# Patient Record
Sex: FEMALE | Race: WHITE | NOT HISPANIC OR LATINO
[De-identification: names, ages, dates, MRNs, and addresses within clinical notes are randomized per-mention and may not be internally consistent; named-entity substitution may affect disease eponyms.]

---

## 2020-12-10 ENCOUNTER — TRANSCRIPTION ENCOUNTER (OUTPATIENT)
Age: 75
End: 2020-12-10

## 2021-02-15 ENCOUNTER — TRANSCRIPTION ENCOUNTER (OUTPATIENT)
Age: 76
End: 2021-02-15

## 2023-04-03 PROBLEM — Z00.00 ENCOUNTER FOR PREVENTIVE HEALTH EXAMINATION: Status: ACTIVE | Noted: 2023-04-03

## 2023-04-04 ENCOUNTER — APPOINTMENT (OUTPATIENT)
Dept: ORTHOPEDIC SURGERY | Facility: CLINIC | Age: 78
End: 2023-04-04
Payer: MEDICARE

## 2023-04-04 VITALS
DIASTOLIC BLOOD PRESSURE: 73 MMHG | WEIGHT: 136 LBS | SYSTOLIC BLOOD PRESSURE: 117 MMHG | HEIGHT: 61 IN | HEART RATE: 70 BPM | BODY MASS INDEX: 25.68 KG/M2

## 2023-04-04 DIAGNOSIS — M25.551 PAIN IN RIGHT HIP: ICD-10-CM

## 2023-04-04 DIAGNOSIS — M16.11 UNILATERAL PRIMARY OSTEOARTHRITIS, RIGHT HIP: ICD-10-CM

## 2023-04-04 PROCEDURE — 73502 X-RAY EXAM HIP UNI 2-3 VIEWS: CPT

## 2023-04-04 PROCEDURE — 99205 OFFICE O/P NEW HI 60 MIN: CPT

## 2023-04-04 NOTE — DISCUSSION/SUMMARY
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 Based upon the patients continued symptoms and failure to respond to conservative treatment I have recommended a right total hip replacement for the patient. A discussion was had with the patient regarding a right total hip replacement. Anterior and posterior exposures were discussed. For this patient an anterior approach is appropriate. A long discussion was had with the patient as what the total joint replacement would entail. A model was used to demonstrate the operation and to discuss bearing surfaces of the implants. The hospitalization and rehabilitation were discussed. The use of perioperative antibiotics and DVT prophylaxis were discussed. The risks, benefits and alternatives to surgical intervention were discussed at length with the patient. Specific risks discussed included: infection, wound breakdown, numbness and damage to nerves, tendon, muscle, arteries or other blood vessels, and the possibility of leg length discrepancy. The possibility of recurrent pain, no improvement in pain and actual worsening of the pain were also mentioned in conversation with the patient. Medical complications related to the patient's general medical health including deep vein thrombosis, pulmonary embolism, heart attack, stroke, death and other complications from anesthesia were discussed as well. The patient was told that we will take steps to minimize these risks by using sterile technique, antibiotics and DVT prophylaxis when appropriate and following the patient postoperatively in the clinic setting to monitor progress. The benefits of surgery were discussed with the patient including the potential to improve the current clinical condition through operative intervention. Alternatives to surgical intervention include continued conservative management which may yield less than optimal results in this particular patient. All questions were answered to the satisfaction of the patient. Models were used as an educational tool. We did discuss implant choices and fixation, with shared decision making with the patient.		 \par \par We are planning for robotic assistance for the total hip replacement. We discussed that this will require placement of iliac crest pins in the contralateral iliac crest for pelvic bone registration to allow for robotic guidance for the surgery. We will utilize robotic assistance to improve accuracy of the implants, and accurate restoration of both leg lengths and femoral offset.

## 2023-04-04 NOTE — ADDENDUM
[FreeTextEntry1] : This note was authored by Oniel Liang working as a medical scribe for Dr. Augustin Nj. The note was reviewed, edited, and revised by Dr. Augustin Nj whom is in agreement with the exam findings, imaging findings, and treatment plan. 04/04/2023

## 2023-04-04 NOTE — PHYSICAL EXAM
[de-identified] : The patient appears well nourished  and in no apparent distress.  The patient is alert and oriented to person, place, and time.   Affect and mood appear normal. The head is normocephalic and atraumatic.  The eyes reveal normal sclera and extra ocular muscles are intact. The tongue is midline with no apparent lesions.  Skin shows normal turgor with no evidence of eczema or psoriasis.  No respiratory distress noted.  Sensation grossly intact.		  [de-identified] : Exam of the right hip shows hip flexion of 90 degrees (with pain), hip external rotation of 20 degrees, hip internal rotation of 15 degrees. Her right leg is 5 mm shorter than her left.  \par 5/5 motor strength bilaterally distally. Sensation intact distally.		  [de-identified] : X-ray: AP view of the pelvis and 2 views of the right hip demonstrate bone on bone arthritis.

## 2023-04-04 NOTE — HISTORY OF PRESENT ILLNESS
[de-identified] : Ms. FAIZA VALERIO is a 77 year old female status post left THR 10 years ago with another orthopedist. Patient states that left hip was complicated with an infection 3 days later which required I+D, then IV antibiotics. She no longer takes any abx and has not had issues since. Patient is here today for her right hip. Her pain is localized to the right groin and is worse with weightbearing activity including walking long distance, rising from a seated position, and using the stairs. She also has difficulty putting on socks and shoes. She has not had injections. Patient takes OTC NSAIDs and Tylenol without improvement. Patient has tried Pt without relief.

## 2023-04-10 ENCOUNTER — APPOINTMENT (OUTPATIENT)
Dept: CT IMAGING | Facility: CLINIC | Age: 78
End: 2023-04-10
Payer: MEDICARE

## 2023-04-10 ENCOUNTER — OUTPATIENT (OUTPATIENT)
Dept: OUTPATIENT SERVICES | Facility: HOSPITAL | Age: 78
LOS: 1 days | End: 2023-04-10
Payer: MEDICARE

## 2023-04-10 DIAGNOSIS — M16.11 UNILATERAL PRIMARY OSTEOARTHRITIS, RIGHT HIP: ICD-10-CM

## 2023-04-10 PROCEDURE — 73700 CT LOWER EXTREMITY W/O DYE: CPT | Mod: 26,RT,MH

## 2023-04-10 PROCEDURE — 73700 CT LOWER EXTREMITY W/O DYE: CPT

## 2023-04-19 ENCOUNTER — OUTPATIENT (OUTPATIENT)
Dept: OUTPATIENT SERVICES | Facility: HOSPITAL | Age: 78
LOS: 1 days | End: 2023-04-19
Payer: MEDICARE

## 2023-04-19 VITALS
HEIGHT: 61 IN | SYSTOLIC BLOOD PRESSURE: 130 MMHG | WEIGHT: 138.89 LBS | HEART RATE: 59 BPM | RESPIRATION RATE: 16 BRPM | TEMPERATURE: 98 F | OXYGEN SATURATION: 95 % | DIASTOLIC BLOOD PRESSURE: 82 MMHG

## 2023-04-19 DIAGNOSIS — F41.9 ANXIETY DISORDER, UNSPECIFIED: ICD-10-CM

## 2023-04-19 DIAGNOSIS — Z98.51 TUBAL LIGATION STATUS: Chronic | ICD-10-CM

## 2023-04-19 DIAGNOSIS — Z90.89 ACQUIRED ABSENCE OF OTHER ORGANS: Chronic | ICD-10-CM

## 2023-04-19 DIAGNOSIS — Z86.39 PERSONAL HISTORY OF OTHER ENDOCRINE, NUTRITIONAL AND METABOLIC DISEASE: ICD-10-CM

## 2023-04-19 DIAGNOSIS — Z01.818 ENCOUNTER FOR OTHER PREPROCEDURAL EXAMINATION: ICD-10-CM

## 2023-04-19 DIAGNOSIS — Z98.49 CATARACT EXTRACTION STATUS, UNSPECIFIED EYE: Chronic | ICD-10-CM

## 2023-04-19 DIAGNOSIS — Z29.9 ENCOUNTER FOR PROPHYLACTIC MEASURES, UNSPECIFIED: ICD-10-CM

## 2023-04-19 DIAGNOSIS — H40.9 UNSPECIFIED GLAUCOMA: ICD-10-CM

## 2023-04-19 DIAGNOSIS — K21.9 GASTRO-ESOPHAGEAL REFLUX DISEASE WITHOUT ESOPHAGITIS: ICD-10-CM

## 2023-04-19 DIAGNOSIS — M16.11 UNILATERAL PRIMARY OSTEOARTHRITIS, RIGHT HIP: ICD-10-CM

## 2023-04-19 DIAGNOSIS — Z96.642 PRESENCE OF LEFT ARTIFICIAL HIP JOINT: Chronic | ICD-10-CM

## 2023-04-19 DIAGNOSIS — Z90.49 ACQUIRED ABSENCE OF OTHER SPECIFIED PARTS OF DIGESTIVE TRACT: Chronic | ICD-10-CM

## 2023-04-19 DIAGNOSIS — Z98.84 BARIATRIC SURGERY STATUS: Chronic | ICD-10-CM

## 2023-04-19 LAB
A1C WITH ESTIMATED AVERAGE GLUCOSE RESULT: 5.5 % — SIGNIFICANT CHANGE UP (ref 4–5.6)
ANION GAP SERPL CALC-SCNC: 10 MMOL/L — SIGNIFICANT CHANGE UP (ref 5–17)
APTT BLD: 32 SEC — SIGNIFICANT CHANGE UP (ref 27.5–35.5)
BLD GP AB SCN SERPL QL: SIGNIFICANT CHANGE UP
BUN SERPL-MCNC: 12.7 MG/DL — SIGNIFICANT CHANGE UP (ref 8–20)
CALCIUM SERPL-MCNC: 9.6 MG/DL — SIGNIFICANT CHANGE UP (ref 8.4–10.5)
CHLORIDE SERPL-SCNC: 107 MMOL/L — SIGNIFICANT CHANGE UP (ref 96–108)
CO2 SERPL-SCNC: 27 MMOL/L — SIGNIFICANT CHANGE UP (ref 22–29)
CREAT SERPL-MCNC: 0.58 MG/DL — SIGNIFICANT CHANGE UP (ref 0.5–1.3)
EGFR: 93 ML/MIN/1.73M2 — SIGNIFICANT CHANGE UP
ESTIMATED AVERAGE GLUCOSE: 111 MG/DL — SIGNIFICANT CHANGE UP (ref 68–114)
GLUCOSE SERPL-MCNC: 86 MG/DL — SIGNIFICANT CHANGE UP (ref 70–99)
HCT VFR BLD CALC: 44.6 % — SIGNIFICANT CHANGE UP (ref 34.5–45)
HGB BLD-MCNC: 14.6 G/DL — SIGNIFICANT CHANGE UP (ref 11.5–15.5)
INR BLD: 1.03 RATIO — SIGNIFICANT CHANGE UP (ref 0.88–1.16)
MCHC RBC-ENTMCNC: 31.3 PG — SIGNIFICANT CHANGE UP (ref 27–34)
MCHC RBC-ENTMCNC: 32.7 GM/DL — SIGNIFICANT CHANGE UP (ref 32–36)
MCV RBC AUTO: 95.7 FL — SIGNIFICANT CHANGE UP (ref 80–100)
MRSA PCR RESULT.: SIGNIFICANT CHANGE UP
PLATELET # BLD AUTO: 216 K/UL — SIGNIFICANT CHANGE UP (ref 150–400)
POTASSIUM SERPL-MCNC: 5.1 MMOL/L — SIGNIFICANT CHANGE UP (ref 3.5–5.3)
POTASSIUM SERPL-SCNC: 5.1 MMOL/L — SIGNIFICANT CHANGE UP (ref 3.5–5.3)
PROTHROM AB SERPL-ACNC: 12 SEC — SIGNIFICANT CHANGE UP (ref 10.5–13.4)
RBC # BLD: 4.66 M/UL — SIGNIFICANT CHANGE UP (ref 3.8–5.2)
RBC # FLD: 13.7 % — SIGNIFICANT CHANGE UP (ref 10.3–14.5)
S AUREUS DNA NOSE QL NAA+PROBE: SIGNIFICANT CHANGE UP
SODIUM SERPL-SCNC: 144 MMOL/L — SIGNIFICANT CHANGE UP (ref 135–145)
WBC # BLD: 5.37 K/UL — SIGNIFICANT CHANGE UP (ref 3.8–10.5)
WBC # FLD AUTO: 5.37 K/UL — SIGNIFICANT CHANGE UP (ref 3.8–10.5)

## 2023-04-19 PROCEDURE — 93010 ELECTROCARDIOGRAM REPORT: CPT

## 2023-04-19 PROCEDURE — G0463: CPT

## 2023-04-19 PROCEDURE — 93005 ELECTROCARDIOGRAM TRACING: CPT

## 2023-04-19 RX ORDER — SODIUM CHLORIDE 9 MG/ML
3 INJECTION INTRAMUSCULAR; INTRAVENOUS; SUBCUTANEOUS EVERY 8 HOURS
Refills: 0 | Status: DISCONTINUED | OUTPATIENT
Start: 2023-04-26 | End: 2023-04-26

## 2023-04-19 NOTE — H&P PST ADULT - PROBLEM SELECTOR PLAN 6
Right Hip Total joint Replacement Direct anterior approach with Keith by Dr. Nj on 4/26/23. Medical Clearance pending

## 2023-04-19 NOTE — H&P PST ADULT - HISTORY OF PRESENT ILLNESS
77 year old female  with PMH of MAGAN, Hypothyroidism, Gerd, here for PST states that she has Right Hip Pain for the last        status post left THR 10 years ago  Her pain is localized to the right groin and is worse with weightbearing activity including walking long distance, rising from a seated position, and using the stairs. She also has difficulty putting on socks and shoes. She has not had injections. Patient takes OTC NSAIDs and Tylenol without improvement. She is scheduled for a Right Hip Total joint Replacement Direct anterior approach with Keith by Dr. Nj on 4/26/23. Medical Clearance pending     77 year old female  with PMH of MAGAN (resolved after weight loss) Depression, Hypothyroidism, Glaucoma, Gerd, here for PST states that she has Right Hip Pain for the last  4 months, Reports 6-10/10 depends on the activity, pain is localized to the right groin and is worse with weightbearing activity including walking long distance, rising from a seated position, and using the stairs. She also has difficulty putting on socks and shoes. She has not had injections. Patient takes OTC NSAIDs and Tylenol without improvement, she is status post left THR 10 years ago, Now She is scheduled for a Right Hip Total joint Replacement Direct anterior approach with Keith by Dr. Nj on 4/26/23. Medical Clearance pending

## 2023-04-19 NOTE — H&P PST ADULT - NSICDXPASTMEDICALHX_GEN_ALL_CORE_FT
PAST MEDICAL HISTORY:  Anxiety and depression     GERD (gastroesophageal reflux disease)     Glaucoma     H/O Hashimoto thyroiditis

## 2023-04-19 NOTE — H&P PST ADULT - MUSCULOSKELETAL
details… normal/ROM intact/normal gait/strength 5/5 bilateral upper extremities/strength 5/5 bilateral lower extremities right hip/decreased ROM/decreased ROM due to pain/decreased strength/abnormal gait

## 2023-04-19 NOTE — H&P PST ADULT - NSICDXFAMILYHX_GEN_ALL_CORE_FT
FAMILY HISTORY:  Sibling  Still living? No  Family history of osteoarthritis, Age at diagnosis: Age Unknown  FH: migraines, Age at diagnosis: Age Unknown  FHx: thyroid disease, Age at diagnosis: Age Unknown

## 2023-04-19 NOTE — H&P PST ADULT - PROBLEM SELECTOR PLAN 3
Caprini Score 10 High risk,  Surgical team should assess /Strongly recommend pharmacological and mechanical measures for VTE prophylaxis

## 2023-04-19 NOTE — H&P PST ADULT - NSICDXPASTSURGICALHX_GEN_ALL_CORE_FT
PAST SURGICAL HISTORY:  H/O bariatric surgery     H/O tubal ligation     History of cataract surgery     History of cholecystectomy     History of left hip replacement     History of tonsillectomy

## 2023-04-19 NOTE — H&P PST ADULT - ASSESSMENT
77 year old female  with PMH of MAGAN (resolved after weight loss) Depression, Hypothyroidism, Glaucoma, Gerd, here for PST states that she has Right Hip Pain for the last  4 months, Reports 6-10/10 depends on the activity, pain is localized to the right groin and is worse with weightbearing activity including walking long distance, rising from a seated position, and using the stairs. She also has difficulty putting on socks and shoes. She has not had injections. Patient takes OTC NSAIDs and Tylenol without improvement, she is status post left THR 10 years ago, Now She is scheduled for a Right Hip Total joint Replacement Direct anterior approach with Keith by Dr. Nj on 23. Medical Clearance pending      medications reviewed, instructions given on what medications to take and what not to take. She can take Synthroid and Paroxetine with one sip of water in the AM of surgery. Continue with all eye drops including the AM of surgery. stop Supplements 5-7 days prior to surgery. All other meds can be continued till the night before surgery. pt is not taking ASA/Plavix/Anticoagulation medication at this time. Asked the pt not to take any NSAID's 5-7 days before surgery and told the pt Tylenol is okay to take for pain, pt verbalized understanding. ERP teaching given and the pt verbalized understanding.   CAPRINI VTE 2.0 SCORE [CLOT updated 2019]    AGE RELATED RISK FACTORS                                                       MOBILITY RELATED FACTORS  [ ] Age 41-60 years                                            (1 Point)                    [ ] Bed rest                                                        (1 Point)  [ ] Age: 61-74 years                                           (2 Points)                  [ ] Plaster cast                                                   (2 Points)  [x ] Age= 75 years                                              (3 Points)                    [ ] Bed bound for more than 72 hours                 (2 Points)    DISEASE RELATED RISK FACTORS                                               GENDER SPECIFIC FACTORS  [x ] Edema in the lower extremities                       (1 Point)              [ ] Pregnancy                                                     (1 Point)  [ ] Varicose veins                                               (1 Point)                     [ ] Post-partum < 6 weeks                                   (1 Point)             [ x] BMI > 25 Kg/m2                                            (1 Point)                     [ ] Hormonal therapy  or oral contraception          (1 Point)                 [ ] Sepsis (in the previous month)                        (1 Point)               [ ] History of pregnancy complications                 (1 point)  [ ] Pneumonia or serious lung disease                                               [ ] Unexplained or recurrent                     (1 Point)           (in the previous month)                               (1 Point)  [ ] Abnormal pulmonary function test                     (1 Point)                 SURGERY RELATED RISK FACTORS  [ ] Acute myocardial infarction                              (1 Point)               [ ]  Section                                             (1 Point)  [ ] Congestive heart failure (in the previous month)  (1 Point)      [ ] Minor surgery                                                  (1 Point)   [ ] Inflammatory bowel disease                             (1 Point)               [ ] Arthroscopic surgery                                        (2 Points)  [ ] Central venous access                                      (2 Points)                [ ] General surgery lasting more than 45 minutes (2 points)  [ ] Malignancy- Present or previous                   (2 Points)                [ x] Elective arthroplasty                                         (5 points)    [ ] Stroke (in the previous month)                          (5 Points)                                                                                                                                                           HEMATOLOGY RELATED FACTORS                                                 TRAUMA RELATED RISK FACTORS  [ ] Prior episodes of VTE                                     (3 Points)                [ ] Fracture of the hip, pelvis, or leg                       (5 Points)  [ ] Positive family history for VTE                         (3 Points)             [ ] Acute spinal cord injury (in the previous month)  (5 Points)  [ ] Prothrombin 17428 A                                     (3 Points)               [ ] Paralysis  (less than 1 month)                             (5 Points)  [ ] Factor V Leiden                                             (3 Points)                  [ ] Multiple Trauma within 1 month                        (5 Points)  [ ] Lupus anticoagulants                                     (3 Points)                                                           [ ] Anticardiolipin antibodies                               (3 Points)                                                       [ ] High homocysteine in the blood                      (3 Points)                                             [ ] Other congenital or acquired thrombophilia      (3 Points)                                                [ ] Heparin induced thrombocytopenia                  (3 Points)                                     Total Score [      10    ]  OPIOID RISK TOOL    ALEX EACH BOX THAT APPLIES AND ADD TOTALS AT THE END    FAMILY HISTORY OF SUBSTANCE ABUSE                 FEMALE         MALE                                                Alcohol                             [  ]1 pt          [  ]3pts                                               Illegal Drugs                     [  ]2 pts        [  ]3pts                                               Rx Drugs                           [  ]4 pts        [  ]4 pts    PERSONAL HISTORY OF SUBSTANCE ABUSE                                                                                          Alcohol                             [  ]3 pts       [  ]3 pts                                               Illegal Drugs                     [  ]4 pts        [  ]4 pts                                               Rx Drugs                           [  ]5 pts        [  ]5 pts    AGE BETWEEN 16-45 YEARS                                      [  ]1 pt         [  ]1 pt    HISTORY OF PREADOLESCENT   SEXUAL ABUSE                                                             [  ]3 pts        [  ]0pts    PSYCHOLOGICAL DISEASE                     ADD, OCD, Bipolar, Schizophrenia        [  ]2 pts         [  ]2 pts                      Depression                                               [  ]1 pt           [  ]1 pt           SCORING TOTAL   (add numbers and type here)              ( 0)                                     A score of 3 or lower indicated LOW risk for future opioid abuse  A score of 4 to 7 indicated moderate risk for future opioid abuse  A score of 8 or higher indicates a high risk for opioid abuse

## 2023-04-19 NOTE — H&P PST ADULT - PRIMARY CARE PROVIDER
will see PCP Dr. Chapman for Medical Clearance pending will see PCP Dr. Chapman for Medical Clearance pending [phone]

## 2023-04-25 ENCOUNTER — TRANSCRIPTION ENCOUNTER (OUTPATIENT)
Age: 78
End: 2023-04-25

## 2023-04-25 RX ORDER — TRANEXAMIC ACID 100 MG/ML
1000 INJECTION, SOLUTION INTRAVENOUS ONCE
Refills: 0 | Status: DISCONTINUED | OUTPATIENT
Start: 2023-04-26 | End: 2023-04-26

## 2023-04-25 RX ORDER — GENTAMICIN SULFATE 40 MG/ML
190 VIAL (ML) INJECTION ONCE
Refills: 0 | Status: DISCONTINUED | OUTPATIENT
Start: 2023-04-26 | End: 2023-04-26

## 2023-04-26 ENCOUNTER — INPATIENT (INPATIENT)
Facility: HOSPITAL | Age: 78
LOS: 1 days | Discharge: HOME HEALTH | DRG: 470 | End: 2023-04-28
Attending: ORTHOPAEDIC SURGERY | Admitting: ORTHOPAEDIC SURGERY
Payer: MEDICARE

## 2023-04-26 ENCOUNTER — APPOINTMENT (OUTPATIENT)
Dept: ORTHOPEDIC SURGERY | Facility: HOSPITAL | Age: 78
End: 2023-04-26

## 2023-04-26 ENCOUNTER — TRANSCRIPTION ENCOUNTER (OUTPATIENT)
Age: 78
End: 2023-04-26

## 2023-04-26 VITALS
HEART RATE: 55 BPM | OXYGEN SATURATION: 100 % | WEIGHT: 138.89 LBS | TEMPERATURE: 98 F | SYSTOLIC BLOOD PRESSURE: 113 MMHG | RESPIRATION RATE: 16 BRPM | DIASTOLIC BLOOD PRESSURE: 65 MMHG

## 2023-04-26 DIAGNOSIS — Z98.84 BARIATRIC SURGERY STATUS: Chronic | ICD-10-CM

## 2023-04-26 DIAGNOSIS — Z98.51 TUBAL LIGATION STATUS: Chronic | ICD-10-CM

## 2023-04-26 DIAGNOSIS — Z90.89 ACQUIRED ABSENCE OF OTHER ORGANS: Chronic | ICD-10-CM

## 2023-04-26 DIAGNOSIS — M16.11 UNILATERAL PRIMARY OSTEOARTHRITIS, RIGHT HIP: ICD-10-CM

## 2023-04-26 DIAGNOSIS — Z90.49 ACQUIRED ABSENCE OF OTHER SPECIFIED PARTS OF DIGESTIVE TRACT: Chronic | ICD-10-CM

## 2023-04-26 DIAGNOSIS — Z96.642 PRESENCE OF LEFT ARTIFICIAL HIP JOINT: Chronic | ICD-10-CM

## 2023-04-26 DIAGNOSIS — Z98.49 CATARACT EXTRACTION STATUS, UNSPECIFIED EYE: Chronic | ICD-10-CM

## 2023-04-26 LAB — ABO RH CONFIRMATION: SIGNIFICANT CHANGE UP

## 2023-04-26 PROCEDURE — 27130 TOTAL HIP ARTHROPLASTY: CPT | Mod: AS,RT

## 2023-04-26 PROCEDURE — 73502 X-RAY EXAM HIP UNI 2-3 VIEWS: CPT | Mod: 26,RT

## 2023-04-26 PROCEDURE — 27130 TOTAL HIP ARTHROPLASTY: CPT | Mod: RT

## 2023-04-26 PROCEDURE — 0055T BONE SRGRY CMPTR CT/MRI IMAG: CPT | Mod: RT

## 2023-04-26 DEVICE — MAKO KNEE TIBIAL CHECKPOINT: Type: IMPLANTABLE DEVICE | Site: RIGHT | Status: FUNCTIONAL

## 2023-04-26 DEVICE — LINER ACET TRIDENT X3 0 DEG 36MM D: Type: IMPLANTABLE DEVICE | Site: RIGHT | Status: FUNCTIONAL

## 2023-04-26 DEVICE — SHELL ACET TRIDENT II D 50MM: Type: IMPLANTABLE DEVICE | Site: RIGHT | Status: FUNCTIONAL

## 2023-04-26 DEVICE — HEAD FEM CEM TAPR PLUS 1.5MM 12/14X36MM: Type: IMPLANTABLE DEVICE | Site: RIGHT | Status: FUNCTIONAL

## 2023-04-26 DEVICE — BONE WAX 2.5GM: Type: IMPLANTABLE DEVICE | Site: RIGHT | Status: FUNCTIONAL

## 2023-04-26 DEVICE — SCREW HEX LO PROF 6.5X25MM: Type: IMPLANTABLE DEVICE | Site: RIGHT | Status: FUNCTIONAL

## 2023-04-26 DEVICE — FLOSEAL FAST PREP 10ML: Type: IMPLANTABLE DEVICE | Site: RIGHT | Status: FUNCTIONAL

## 2023-04-26 DEVICE — SCREW HEX LO PROF 6.5X40MM: Type: IMPLANTABLE DEVICE | Site: RIGHT | Status: FUNCTIONAL

## 2023-04-26 DEVICE — MAKO BONE PIN 4MM X 170MM: Type: IMPLANTABLE DEVICE | Site: RIGHT | Status: FUNCTIONAL

## 2023-04-26 DEVICE — STEM FEM ACTIS TAPR STD COLLAR 12/14 SZ 7: Type: IMPLANTABLE DEVICE | Site: RIGHT | Status: FUNCTIONAL

## 2023-04-26 DEVICE — MAKO CHECKPOINT 3.5MM HEX IMPACTION: Type: IMPLANTABLE DEVICE | Site: RIGHT | Status: FUNCTIONAL

## 2023-04-26 RX ORDER — OXYCODONE HYDROCHLORIDE 5 MG/1
10 TABLET ORAL
Refills: 0 | Status: DISCONTINUED | OUTPATIENT
Start: 2023-04-26 | End: 2023-04-28

## 2023-04-26 RX ORDER — ONDANSETRON 8 MG/1
4 TABLET, FILM COATED ORAL EVERY 6 HOURS
Refills: 0 | Status: DISCONTINUED | OUTPATIENT
Start: 2023-04-26 | End: 2023-04-28

## 2023-04-26 RX ORDER — ASPIRIN/CALCIUM CARB/MAGNESIUM 324 MG
81 TABLET ORAL
Refills: 0 | Status: DISCONTINUED | OUTPATIENT
Start: 2023-04-27 | End: 2023-04-28

## 2023-04-26 RX ORDER — FENTANYL CITRATE 50 UG/ML
25 INJECTION INTRAVENOUS
Refills: 0 | Status: DISCONTINUED | OUTPATIENT
Start: 2023-04-26 | End: 2023-04-26

## 2023-04-26 RX ORDER — APREPITANT 80 MG/1
40 CAPSULE ORAL ONCE
Refills: 0 | Status: COMPLETED | OUTPATIENT
Start: 2023-04-26 | End: 2023-04-26

## 2023-04-26 RX ORDER — SODIUM CHLORIDE 9 MG/ML
1000 INJECTION INTRAMUSCULAR; INTRAVENOUS; SUBCUTANEOUS
Refills: 0 | Status: DISCONTINUED | OUTPATIENT
Start: 2023-04-26 | End: 2023-04-28

## 2023-04-26 RX ORDER — ACETAMINOPHEN 500 MG
1000 TABLET ORAL ONCE
Refills: 0 | Status: COMPLETED | OUTPATIENT
Start: 2023-04-26 | End: 2023-04-26

## 2023-04-26 RX ORDER — ASCORBIC ACID 60 MG
2 TABLET,CHEWABLE ORAL
Refills: 0 | DISCHARGE

## 2023-04-26 RX ORDER — KETOROLAC TROMETHAMINE 30 MG/ML
15 SYRINGE (ML) INJECTION EVERY 6 HOURS
Refills: 0 | Status: DISCONTINUED | OUTPATIENT
Start: 2023-04-26 | End: 2023-04-27

## 2023-04-26 RX ORDER — TIMOLOL 0.5 %
1 DROPS OPHTHALMIC (EYE)
Refills: 0 | DISCHARGE

## 2023-04-26 RX ORDER — ONDANSETRON 8 MG/1
4 TABLET, FILM COATED ORAL ONCE
Refills: 0 | Status: DISCONTINUED | OUTPATIENT
Start: 2023-04-26 | End: 2023-04-26

## 2023-04-26 RX ORDER — MAGNESIUM HYDROXIDE 400 MG/1
30 TABLET, CHEWABLE ORAL DAILY
Refills: 0 | Status: DISCONTINUED | OUTPATIENT
Start: 2023-04-26 | End: 2023-04-28

## 2023-04-26 RX ORDER — VANCOMYCIN HCL 1 G
1000 VIAL (EA) INTRAVENOUS
Refills: 0 | Status: COMPLETED | OUTPATIENT
Start: 2023-04-26 | End: 2023-04-27

## 2023-04-26 RX ORDER — POLYETHYLENE GLYCOL 3350 17 G/17G
17 POWDER, FOR SOLUTION ORAL AT BEDTIME
Refills: 0 | Status: DISCONTINUED | OUTPATIENT
Start: 2023-04-26 | End: 2023-04-28

## 2023-04-26 RX ORDER — ACETAMINOPHEN 500 MG
975 TABLET ORAL ONCE
Refills: 0 | Status: COMPLETED | OUTPATIENT
Start: 2023-04-26 | End: 2023-04-26

## 2023-04-26 RX ORDER — SODIUM CHLORIDE 9 MG/ML
1000 INJECTION, SOLUTION INTRAVENOUS
Refills: 0 | Status: DISCONTINUED | OUTPATIENT
Start: 2023-04-26 | End: 2023-04-26

## 2023-04-26 RX ORDER — LATANOPROST 0.05 MG/ML
1 SOLUTION/ DROPS OPHTHALMIC; TOPICAL
Refills: 0 | DISCHARGE

## 2023-04-26 RX ORDER — VANCOMYCIN HCL 1 G
1000 VIAL (EA) INTRAVENOUS ONCE
Refills: 0 | Status: COMPLETED | OUTPATIENT
Start: 2023-04-26 | End: 2023-04-26

## 2023-04-26 RX ORDER — HYDROMORPHONE HYDROCHLORIDE 2 MG/ML
4 INJECTION INTRAMUSCULAR; INTRAVENOUS; SUBCUTANEOUS
Refills: 0 | Status: DISCONTINUED | OUTPATIENT
Start: 2023-04-26 | End: 2023-04-28

## 2023-04-26 RX ORDER — PREGABALIN 225 MG/1
0 CAPSULE ORAL
Refills: 0 | DISCHARGE

## 2023-04-26 RX ORDER — SENNA PLUS 8.6 MG/1
2 TABLET ORAL AT BEDTIME
Refills: 0 | Status: DISCONTINUED | OUTPATIENT
Start: 2023-04-26 | End: 2023-04-28

## 2023-04-26 RX ORDER — PANTOPRAZOLE SODIUM 20 MG/1
40 TABLET, DELAYED RELEASE ORAL
Refills: 0 | Status: DISCONTINUED | OUTPATIENT
Start: 2023-04-26 | End: 2023-04-28

## 2023-04-26 RX ORDER — OXYCODONE HYDROCHLORIDE 5 MG/1
5 TABLET ORAL
Refills: 0 | Status: DISCONTINUED | OUTPATIENT
Start: 2023-04-26 | End: 2023-04-28

## 2023-04-26 RX ORDER — ESOMEPRAZOLE MAGNESIUM 40 MG/1
1 CAPSULE, DELAYED RELEASE ORAL
Refills: 0 | DISCHARGE

## 2023-04-26 RX ORDER — ACETAMINOPHEN 500 MG
975 TABLET ORAL EVERY 8 HOURS
Refills: 0 | Status: DISCONTINUED | OUTPATIENT
Start: 2023-04-26 | End: 2023-04-28

## 2023-04-26 RX ORDER — LEVOTHYROXINE SODIUM 125 MCG
1 TABLET ORAL
Refills: 0 | DISCHARGE

## 2023-04-26 RX ADMIN — FENTANYL CITRATE 25 MICROGRAM(S): 50 INJECTION INTRAVENOUS at 20:10

## 2023-04-26 RX ADMIN — OXYCODONE HYDROCHLORIDE 10 MILLIGRAM(S): 5 TABLET ORAL at 22:10

## 2023-04-26 RX ADMIN — FENTANYL CITRATE 25 MICROGRAM(S): 50 INJECTION INTRAVENOUS at 20:30

## 2023-04-26 RX ADMIN — Medication 1000 MILLIGRAM(S): at 20:30

## 2023-04-26 RX ADMIN — Medication 15 MILLIGRAM(S): at 23:13

## 2023-04-26 RX ADMIN — APREPITANT 40 MILLIGRAM(S): 80 CAPSULE ORAL at 11:55

## 2023-04-26 RX ADMIN — Medication 975 MILLIGRAM(S): at 11:55

## 2023-04-26 RX ADMIN — SODIUM CHLORIDE 75 MILLILITER(S): 9 INJECTION INTRAMUSCULAR; INTRAVENOUS; SUBCUTANEOUS at 21:09

## 2023-04-26 RX ADMIN — POLYETHYLENE GLYCOL 3350 17 GRAM(S): 17 POWDER, FOR SOLUTION ORAL at 21:10

## 2023-04-26 RX ADMIN — OXYCODONE HYDROCHLORIDE 10 MILLIGRAM(S): 5 TABLET ORAL at 21:10

## 2023-04-26 RX ADMIN — Medication 250 MILLIGRAM(S): at 15:05

## 2023-04-26 RX ADMIN — Medication 400 MILLIGRAM(S): at 20:10

## 2023-04-26 RX ADMIN — SENNA PLUS 2 TABLET(S): 8.6 TABLET ORAL at 21:10

## 2023-04-26 RX ADMIN — Medication 15 MILLIGRAM(S): at 23:03

## 2023-04-26 NOTE — DISCHARGE NOTE PROVIDER - NSDCFUADDINST_GEN_ALL_CORE_FT
The patient will be seen in the office between 2-3 weeks for wound check.   **Your first post-operative visit has been scheduled prior to your admission. PLEASE CONTACT OFFICE TO CONFIRM THE APPOINTMENT DATE.   **  The silver based dressing is to be removed 7 days from the date of surgery.   ** CONTACT THE OFFICE IF THE FOLLOWING DEVELOP:  - the dressing becomes soiled or saturated  - you develop a fever greater that 101F  - the wound becomes red or you develop blistering around the wound  * Patient may shower after post-op day #3.   * The patient will continue home PT consistent with anterior total hip replacement protocol and will continue to practice anterior total hip precautions for a minimum of 6 week. Transition to outpatient PT will occur at the time of the first office visit.   * The patient is FULL weight bearing.  * The patient will continue ASPIRIN for 6 weeks after surgery for blood clot prevention.  * While on aspirin, the patient will take daily omeprazole or other similar medication to protect the stomach from irritation.   * The patient will take OXYCODONE AND TYLENOL for pain control and adjust according to prescription and patient needs. Contact the office if pain increases while taking prescribed pain medications or related concerns develop.   * Celebrex will be taken twice daily for 3 weeks for pain control and prevention of excessive bone growth. Additional prescription may be requested at your office follow-up visit.  * The patient will take Senna S while taking oxycodone to prevent narcotic associated constipation.  Additionally, increase water intake (drink at least 8 glasses of water daily) and try adding fiber to the diet by eating fruits, vegetables and foods that are rich in grains. If constipation is experienced, contact the medical/primary care provider to discuss further treatment options.  * To avoid injury at home:  - continue use of rolling walker until cleared by physical therapist  - have family or friend remove all throw rug or objects in hallways that may present a trip hazard.  - if you experience any dizziness or medical concerns, call your medical doctor or  911.  * The implant may activate metal detection devices.  The patient will be seen in the office between 2-3 weeks for wound check.   **Your first post-operative visit has been scheduled prior to your admission. PLEASE CONTACT OFFICE TO CONFIRM THE APPOINTMENT DATE.   **  The silver based dressing is to be removed 7 days from the date of surgery.   ** CONTACT THE OFFICE IF THE FOLLOWING DEVELOP:  - the dressing becomes soiled or saturated  - you develop a fever greater that 101F  - the wound becomes red or you develop blistering around the wound  * Patient may shower after post-op day #3.   * The patient will continue home PT consistent with anterior total hip replacement protocol and will continue to practice anterior total hip precautions for a minimum of 6 week. Transition to outpatient PT will occur at the time of the first office visit.   * The patient is FULL weight bearing.  * The patient will continue ASPIRIN for 6 weeks after surgery for blood clot prevention.  * While on aspirin, the patient will take daily omeprazole or other similar medication to protect the stomach from irritation.   * The patient will take OXYCODONE AND TYLENOL for pain control and adjust according to prescription and patient needs. Contact the office if pain increases while taking prescribed pain medications or related concerns develop.   * Mobic will be taken twice daily for 3 weeks for pain control and prevention of excessive bone growth. Additional prescription may be requested at your office follow-up visit.  * The patient will take Senna S while taking oxycodone to prevent narcotic associated constipation.  Additionally, increase water intake (drink at least 8 glasses of water daily) and try adding fiber to the diet by eating fruits, vegetables and foods that are rich in grains. If constipation is experienced, contact the medical/primary care provider to discuss further treatment options.  * To avoid injury at home:  - continue use of rolling walker until cleared by physical therapist  - have family or friend remove all throw rug or objects in hallways that may present a trip hazard.  - if you experience any dizziness or medical concerns, call your medical doctor or  911.  * The implant may activate metal detection devices.

## 2023-04-26 NOTE — DISCHARGE NOTE PROVIDER - CARE PROVIDER_API CALL
Augustin Nj)  Orthopaedic Surgery  [address], [address]  [place] [address]  Phone: [phone]  Fax: [phone]  Follow Up Time:

## 2023-04-26 NOTE — DISCHARGE NOTE PROVIDER - HOSPITAL COURSE
The patient underwent a RIGHT ANTERIOR TOTAL HIP REPLACEMENT on 4/26/23. The patient received antibiotics consistent with SCIP guidelines. The patient underwent the procedure and had no intra-operative complications. Post-operatively, the patient was seen by medicine and PT. The patient received ASPIRIN for DVTP. The patient received pain medications per orthopedic pain management pathway and the pain was appropriately controlled. Patient was instructed on anterior total hip precautions by PT. The patient did not have any post-operative medical complications. The patient was discharged in stable condition.

## 2023-04-26 NOTE — DISCHARGE NOTE PROVIDER - NSDCCPCAREPLAN_GEN_ALL_CORE_FT
PRINCIPAL DISCHARGE DIAGNOSIS  Diagnosis: Unilateral primary osteoarthritis, right hip  Assessment and Plan of Treatment:

## 2023-04-26 NOTE — DISCHARGE NOTE PROVIDER - NSDCMRMEDTOKEN_GEN_ALL_CORE_FT
ashwagandha:   latanoprost 0.005% ophthalmic solution: 1 in each eye once a day (at bedtime)  magnesium carbonate 250 mg oral capsule: orally once a day  Multiple Vitamins oral capsule: 1 orally once a day  neurovia:   NexIUM 20 mg oral delayed release capsule: 1 orally once a day  NexIUM 40 mg oral delayed release capsule: 1 orally  PARoxetine 40 mg oral tablet: 1 orally once a day  Synthroid 125 mcg (0.125 mg) oral tablet: 1 orally every other day  Synthroid 150 mcg (0.15 mg) oral tablet: 1 orally every other day  Timolol Maleate (Eqv-Timoptic) 0.5% ophthalmic solution: 1 in each affected eye 2 times a day  vitamin b12:   Vitamin C 500 mg oral capsule: 2 orally once a day   Aspirin Enteric Coated 81 mg oral delayed release tablet: 1 tab(s) orally 2 times a day  latanoprost 0.005% ophthalmic solution: 1 in each eye once a day (at bedtime)  Mobic 7.5 mg oral tablet: 1 tab(s) orally 2 times a day x 21 days  Multiple Vitamins oral capsule: 1 orally once a day  NexIUM 20 mg oral delayed release capsule: 1 orally once a day  oxyCODONE 5 mg oral tablet: 1 tab(s) orally every 6 hours as needed for  moderate pain MDD: 4  PARoxetine 40 mg oral tablet: 1 orally once a day  Senna S 50 mg-8.6 mg oral tablet: 2 tab(s) orally once a day (at bedtime) x 7 days  Synthroid 125 mcg (0.125 mg) oral tablet: 1 orally every other day  Timolol Maleate (Eqv-Timoptic) 0.5% ophthalmic solution: 1 in each affected eye 2 times a day  Tylenol 325 mg oral tablet: 2 tab(s) orally every 6 hours as needed for  mild pain  vitamin b12:   Vitamin C 500 mg oral capsule: 2 orally once a day

## 2023-04-26 NOTE — PROGRESS NOTE ADULT - SUBJECTIVE AND OBJECTIVE BOX
Ortho Post Op Check    Name: FAIZA VALERIO    MR #: [id number]    Procedure: right anterior total hip arthroplasty DAA  Surgeon: Ondina    Pt comfortable without complaints, pain controlled  Denies CP, SOB, N/V, numbness/tingling     General Exam:  Vital Signs Last 24 Hrs  T(C): 36.4 (04-26-23 @ 21:00), Max: 36.7 (04-26-23 @ 20:00)  T(F): 97.5 (04-26-23 @ 21:00), Max: 98 (04-26-23 @ 20:00)  HR: 55 (04-26-23 @ 21:00) (55 - 62)  BP: 156/77 (04-26-23 @ 21:00) (113/65 - 156/77)  BP(mean): 82 (04-26-23 @ 20:30) (82 - 91)  RR: 18 (04-26-23 @ 21:00) (13 - 19)  SpO2: 97% (04-26-23 @ 21:00) (97% - 100%)    General: Pt Alert and oriented, NAD, controlled pain.  Dressings C/D/I. No bleeding.  Pulses: 2+ dorsalis pedis pulse. Cap refill < 2 sec.  Sensation: Grossly intact to light touch without deficit.  Motor: + EHL/FHL/TA/GS    T(C): 36.4 (04-26-23 @ 21:00), Max: 36.7 (04-26-23 @ 11:59)  HR: 55 (04-26-23 @ 21:00) (55 - 62)  BP: 156/77 (04-26-23 @ 21:00) (113/65 - 156/77)  RR: 18 (04-26-23 @ 21:00) (13 - 19)  SpO2: 97% (04-26-23 @ 21:00) (97% - 100%)    Post-op X-Ray:    Pelvis & hip films reviewed. Implants are in appropriate position. No fracture or dislocation noted. Patient is WBAT of the surgical extremity.     A/P: 77yFemale POD#0 s/p right anterior total hip arthroplasty DAA  - Stable  - Pain Control  - DVT ppx: asa  - WBAT  - anterior hip precautions

## 2023-04-26 NOTE — BRIEF OPERATIVE NOTE - NSICDXBRIEFPOSTOP_GEN_ALL_CORE_FT
POST-OP DIAGNOSIS:  Unilateral primary osteoarthritis, right hip 26-Apr-2023 17:51:10  Terence John

## 2023-04-26 NOTE — BRIEF OPERATIVE NOTE - NSICDXBRIEFPREOP_GEN_ALL_CORE_FT
PRE-OP DIAGNOSIS:  Unilateral primary osteoarthritis, right hip 26-Apr-2023 17:51:03  Terence John

## 2023-04-26 NOTE — PATIENT PROFILE ADULT - FALL HARM RISK - HARM RISK INTERVENTIONS
Assistance with ambulation/Assistance OOB with selected safe patient handling equipment/Communicate Risk of Fall with Harm to all staff/Discuss with provider need for PT consult/Monitor gait and stability/Provide patient with walking aids - walker, cane, crutches/Reinforce activity limits and safety measures with patient and family/Sit up slowly, dangle for a short time, stand at bedside before walking/Tailored Fall Risk Interventions/Use of alarms - bed, chair and/or voice tab/Visual Cue: Yellow wristband and red socks/Bed in lowest position, wheels locked, appropriate side rails in place/Call bell, personal items and telephone in reach/Instruct patient to call for assistance before getting out of bed or chair/Non-slip footwear when patient is out of bed/[place] to call system/Physically safe environment - no spills, clutter or unnecessary equipment/Purposeful Proactive Rounding/Room/bathroom lighting operational, light cord in reach

## 2023-04-26 NOTE — DISCHARGE NOTE PROVIDER - NSDCFUSCHEDAPPT_GEN_ALL_CORE_FT
Augustin Nj  [facility]  ORTHOSUR [address]  Scheduled Appointment: 05/16/2023    Augustin Nj  [facility]  [address]THOR [address] [facility] [address]  Scheduled Appointment: 06/06/2023

## 2023-04-27 ENCOUNTER — TRANSCRIPTION ENCOUNTER (OUTPATIENT)
Age: 78
End: 2023-04-27

## 2023-04-27 PROCEDURE — 99232 SBSQ HOSP IP/OBS MODERATE 35: CPT

## 2023-04-27 RX ORDER — LEVOTHYROXINE SODIUM 125 MCG
1 TABLET ORAL
Refills: 0 | DISCHARGE

## 2023-04-27 RX ORDER — ASPIRIN/CALCIUM CARB/MAGNESIUM 324 MG
1 TABLET ORAL
Qty: 84 | Refills: 0
Start: 2023-04-27 | End: 2023-06-07

## 2023-04-27 RX ORDER — MAGNESIUM CARBONATE 54 MG/5 ML
0 LIQUID (ML) ORAL
Refills: 0 | DISCHARGE

## 2023-04-27 RX ORDER — SODIUM CHLORIDE 9 MG/ML
500 INJECTION, SOLUTION INTRAVENOUS ONCE
Refills: 0 | Status: COMPLETED | OUTPATIENT
Start: 2023-04-27 | End: 2023-04-27

## 2023-04-27 RX ORDER — ACETAMINOPHEN 500 MG
2 TABLET ORAL
Qty: 56 | Refills: 0
Start: 2023-04-27 | End: 2023-05-03

## 2023-04-27 RX ORDER — MELOXICAM 15 MG/1
1 TABLET ORAL
Qty: 42 | Refills: 0
Start: 2023-04-27 | End: 2023-05-17

## 2023-04-27 RX ORDER — SENNOSIDES/DOCUSATE SODIUM 8.6MG-50MG
2 TABLET ORAL
Qty: 14 | Refills: 0
Start: 2023-04-27 | End: 2023-05-03

## 2023-04-27 RX ORDER — ACETAMINOPHEN 500 MG
1000 TABLET ORAL ONCE
Refills: 0 | Status: COMPLETED | OUTPATIENT
Start: 2023-04-27 | End: 2023-04-27

## 2023-04-27 RX ORDER — METOCLOPRAMIDE HCL 10 MG
10 TABLET ORAL ONCE
Refills: 0 | Status: COMPLETED | OUTPATIENT
Start: 2023-04-27 | End: 2023-04-27

## 2023-04-27 RX ORDER — OXYCODONE HYDROCHLORIDE 5 MG/1
1 TABLET ORAL
Qty: 28 | Refills: 0
Start: 2023-04-27 | End: 2023-05-03

## 2023-04-27 RX ORDER — ESOMEPRAZOLE MAGNESIUM 40 MG/1
1 CAPSULE, DELAYED RELEASE ORAL
Refills: 0 | DISCHARGE

## 2023-04-27 RX ADMIN — PANTOPRAZOLE SODIUM 40 MILLIGRAM(S): 20 TABLET, DELAYED RELEASE ORAL at 04:38

## 2023-04-27 RX ADMIN — Medication 15 MILLIGRAM(S): at 19:35

## 2023-04-27 RX ADMIN — SODIUM CHLORIDE 500 MILLILITER(S): 9 INJECTION, SOLUTION INTRAVENOUS at 10:09

## 2023-04-27 RX ADMIN — Medication 15 MILLIGRAM(S): at 19:21

## 2023-04-27 RX ADMIN — Medication 400 MILLIGRAM(S): at 18:30

## 2023-04-27 RX ADMIN — Medication 15 MILLIGRAM(S): at 13:15

## 2023-04-27 RX ADMIN — Medication 81 MILLIGRAM(S): at 04:38

## 2023-04-27 RX ADMIN — Medication 1000 MILLIGRAM(S): at 19:00

## 2023-04-27 RX ADMIN — Medication 10 MILLIGRAM(S): at 18:30

## 2023-04-27 RX ADMIN — OXYCODONE HYDROCHLORIDE 10 MILLIGRAM(S): 5 TABLET ORAL at 04:39

## 2023-04-27 RX ADMIN — Medication 975 MILLIGRAM(S): at 22:44

## 2023-04-27 RX ADMIN — Medication 15 MILLIGRAM(S): at 04:45

## 2023-04-27 RX ADMIN — Medication 975 MILLIGRAM(S): at 04:38

## 2023-04-27 RX ADMIN — Medication 30 MILLILITER(S): at 10:20

## 2023-04-27 RX ADMIN — Medication 250 MILLIGRAM(S): at 02:46

## 2023-04-27 RX ADMIN — ONDANSETRON 4 MILLIGRAM(S): 8 TABLET, FILM COATED ORAL at 13:14

## 2023-04-27 RX ADMIN — Medication 975 MILLIGRAM(S): at 23:44

## 2023-04-27 RX ADMIN — Medication 975 MILLIGRAM(S): at 04:45

## 2023-04-27 RX ADMIN — OXYCODONE HYDROCHLORIDE 10 MILLIGRAM(S): 5 TABLET ORAL at 05:40

## 2023-04-27 RX ADMIN — Medication 30 MILLILITER(S): at 16:30

## 2023-04-27 RX ADMIN — Medication 81 MILLIGRAM(S): at 18:28

## 2023-04-27 RX ADMIN — Medication 15 MILLIGRAM(S): at 04:39

## 2023-04-27 NOTE — DISCHARGE NOTE NURSING/CASE MANAGEMENT/SOCIAL WORK - PATIENT PORTAL LINK FT
You can access the FollowMyHealth Patient Portal offered by [facility] by registering at the following website: [email or web address]. By joining [employer]’s FollowMyHealth portal, you will also be able to view your health information using other applications (apps) compatible with our system.

## 2023-04-27 NOTE — PROGRESS NOTE ADULT - SUBJECTIVE AND OBJECTIVE BOX
Ortho Post Op Check    Name: FAIZA VALERIO    MR #: [id number]    Procedure: Right DAA total hip arthroplasty   Surgeon: Dr Nj    Pt comfortable without complaints, pain controlled  Denies CP, SOB, N/V, numbness/tingling           PAST MEDICAL & SURGICAL HISTORY:  Anxiety and depression    H/O Hashimoto thyroiditis    GERD (gastroesophageal reflux disease)    Glaucoma    History of tonsillectomy    H/O tubal ligation    History of cholecystectomy    History of left hip replacement    History of cataract surgery    H/O bariatric surgery              General Exam:  Vital Signs Last 24 Hrs  T(C): 36.4 (04-27-23 @ 04:12), Max: 36.4 (04-27-23 @ 04:12)  T(F): 97.5 (04-27-23 @ 04:12), Max: 97.5 (04-27-23 @ 04:12)  HR: 51 (04-27-23 @ 04:12) (51 - 51)  BP: 115/74 (04-27-23 @ 04:12) (115/74 - 115/74)  BP(mean): --  RR: 18 (04-27-23 @ 04:12) (18 - 18)  SpO2: 94% (04-27-23 @ 04:12) (94% - 94%)    General: Pt Alert and oriented, NAD, controlled pain.  Right ant hip mepilex bandage remains C/D/I. No bleeding.  Pulses: 2+ dorsalis pedis pulse. Cap refill < 2 sec.  Sensation: Grossly intact to light touch without deficit.  Motor: + EHL/FHL/TA/GS  Calf soft, supple and nontender           MEDICATIONS  (STANDING):  acetaminophen     Tablet .. 975 milliGRAM(s) Oral every 8 hours  aspirin enteric coated 81 milliGRAM(s) Oral two times a day  ketorolac   Injectable 15 milliGRAM(s) IV Push every 6 hours  multivitamin 1 Tablet(s) Oral daily  pantoprazole    Tablet 40 milliGRAM(s) Oral before breakfast  polyethylene glycol 3350 17 Gram(s) Oral at bedtime  senna 2 Tablet(s) Oral at bedtime  sodium chloride 0.9%. 1000 milliLiter(s) (75 mL/Hr) IV Continuous <Continuous>    MEDICATIONS  (PRN):  aluminum hydroxide/magnesium hydroxide/simethicone Suspension 30 milliLiter(s) Oral four times a day PRN Indigestion  HYDROmorphone   Tablet 4 milliGRAM(s) Oral every 3 hours PRN Severe Pain (7 - 10)  magnesium hydroxide Suspension 30 milliLiter(s) Oral daily PRN Constipation  ondansetron Injectable 4 milliGRAM(s) IV Push every 6 hours PRN Nausea and/or Vomiting  oxyCODONE    IR 10 milliGRAM(s) Oral every 3 hours PRN Moderate Pain (4 - 6)  oxyCODONE    IR 5 milliGRAM(s) Oral every 3 hours PRN Mild Pain (1 - 3)              A/P: 77yFemale POD#1 s/p Right DAA total hip arthroplasty   - Stable  - Pain Control  - DVT ppx: ASA  - Post op abx: vanco given   - PT eval pending  - Weight bearing status: WBAT w Anterior precautions   - DC to home today pending PT

## 2023-04-27 NOTE — CONSULT NOTE ADULT - ASSESSMENT
78 y/o F with hx of Depression, Hypothyroidism, Glaucoma, Gerd, OA s/p status post left THR 10 years ago, she has left hip pain for 4 moths now, has been worsening, she came in here for elective Right Hip Total joint Replacement Direct anterior approach with Keith by Dr. Nj on 4/26/23 s/p procedure.     Plan:     OA of the Right hip s/p THR post op day 01:     - post op no complications  - VS stable  - abx per ortho   - c/w anti hypertensive to be restarted post op day 02 except if blood pressure goes >150 systolic   - c/w IVF x 24 hrs then reassess per ortho  - opiate induced constipation regimen   - encouraging incentive spirometry   -c/w local wound care per ortho   -DVT prophylaxis and Pain meds as per Ortho team   -PT/OT and weight bearing per ortho         Hypothyroid: On Synthroid     GERD: On NexIUM 40 mg daily     Anxiety: PARoxetine 40 mg once a day    Glaucoma: Latanoprost 0.005% 1 in each eye once a day (at bedtime) and Timolol Maleate 1 in each affected eye 2 times a day      has dizziness: getting IV fluids, will work with PT and OT

## 2023-04-27 NOTE — PHYSICAL THERAPY INITIAL EVALUATION ADULT - ADDITIONAL COMMENTS
Pt lives in a house  with 5 steps to enter with  rails and 0 stairs inside.  Pt owns medical equipment: SAC, RW   Pt lives with: Grandson   Someone is always available to provide assist.

## 2023-04-27 NOTE — PHYSICAL THERAPY INITIAL EVALUATION ADULT - ACTIVE RANGE OF MOTION EXAMINATION, REHAB EVAL
Right LE grossly 2/5/bilateral upper extremity Active ROM was WFL (within functional limits)/bilateral  lower extremity Active ROM was WFL (within functional limits)

## 2023-04-27 NOTE — PHYSICAL THERAPY INITIAL EVALUATION ADULT - GAIT TRAINING, PT EVAL
Time Frame:  1-2   days   Goal: Modified Independent with RW x 300 feet / Stairs: pt will be independent with 5 steps

## 2023-04-27 NOTE — CONSULT NOTE ADULT - SUBJECTIVE AND OBJECTIVE BOX
76 y/o F with hx of Depression, Hypothyroidism, Glaucoma, Gerd, OA s/p status post left THR 10 years ago, she has left hip pain for 4 moths now, has been worsening, she came in here for elective Right Hip Total joint Replacement Direct anterior approach with Keith by Dr. Nj on 4/26/23 s/p procedure, Patient tolerated procedure well, her pain is well controlled, she has some dizziness, denies chest pain or sob, fever, chills, chest pain         Allergies:  	Macrodantin: Drug, Angioedema, Urticaria, Short breath  	Zosyn: Drug, Anaphylaxis  	sulfa drugs: Drug Category, Angioedema    PAST MEDICAL HISTORY:  Anxiety and depression     GERD (gastroesophageal reflux disease)     Glaucoma     H/O Hashimoto thyroiditis.     PAST SURGICAL HISTORY:  H/O bariatric surgery     H/O tubal ligation     History of cataract surgery     History of cholecystectomy     History of left hip replacement     History of tonsillectomy.     FAMILY HISTORY:  Sibling  Still living? No  Family history of osteoarthritis, Age at diagnosis: Age Unknown  FH: migraines, Age at diagnosis: Age Unknown  FHx: thyroid disease, Age at diagnosis: Age Unknown.     Anesthesia History:  · Previous Reaction to Anesthesia	none  · Family History of Anesthesia Reaction/Malignant Hyperthermia	No  · Latex Allergy	No    Social History:   Not a smoker, drinker or using any drugs      Home Medications:   * Incomplete Medication History as of 19-Apr-2023 09:19 documented in Structured Notes  · 	Synthroid 125 mcg (0.125 mg) oral tablet: Last Dose Taken:  , 1 orally every other day  · 	NexIUM 40 mg oral delayed release capsule: Last Dose Taken:  , 1 orally  · 	Synthroid 150 mcg (0.15 mg) oral tablet: Last Dose Taken:  , 1 orally every other day  · 	Vitamin C 500 mg oral capsule: 2 orally once a day  · 	Multiple Vitamins oral capsule: Last Dose Taken:  , 1 orally once a day  · 	PARoxetine 40 mg oral tablet: Last Dose Taken:  , 1 orally once a day  · 	magnesium carbonate 250 mg oral capsule: Last Dose Taken:  , orally once a day  · 	latanoprost 0.005% ophthalmic solution: Last Dose Taken:  , 1 in each eye once a day (at bedtime)  · 	Timolol Maleate (Eqv-Timoptic) 0.5% ophthalmic solution: 1 in each affected eye 2 times a day  · 	NexIUM 20 mg oral delayed release capsule: Last Dose Taken:  , 1 orally once a day  · 	ashwagandha: Last Dose Taken:    · 	neurovia: Last Dose Taken:            Vital Signs Last 24 Hrs  T(C): 36.7 (27 Apr 2023 08:59), Max: 36.7 (26 Apr 2023 20:00)  T(F): 98 (27 Apr 2023 08:59), Max: 98 (26 Apr 2023 20:00)  HR: 59 (27 Apr 2023 10:54) (51 - 62)  BP: 117/78 (27 Apr 2023 10:54) (96/61 - 156/77)  BP(mean): 82 (26 Apr 2023 20:30) (82 - 91)  RR: 18 (27 Apr 2023 08:59) (13 - 19)  SpO2: 94% (27 Apr 2023 08:59) (94% - 100%)    Parameters below as of 27 Apr 2023 08:59  Patient On (Oxygen Delivery Method): room air        PHYSICAL EXAM:    GENERAL: Elderly female looking comfortable   HEENT: PERRL, +EOMI  NECK: soft, Supple, No JVD,   CHEST/LUNG: Clear to auscultate bilaterally; No wheezing  HEART: S1S2+, Regular rate and rhythm; No murmurs  ABDOMEN: Soft, Nontender, Nondistended; Bowel sounds present  EXTREMITIES:  1+ Peripheral Pulses, No edema, right hip with clean dressings on, no bleeding or soaking   SKIN: No rashes or lesions  NEURO: AAOX3, no focal deficits, no motor r sensory loss  PSYCH: normal mood      LABS:                  I&O's Summary    26 Apr 2023 07:01  -  27 Apr 2023 07:00  --------------------------------------------------------  IN: 825 mL / OUT: 150 mL / NET: 675 mL        MEDICATIONS  (STANDING):  acetaminophen     Tablet .. 975 milliGRAM(s) Oral every 8 hours  aspirin enteric coated 81 milliGRAM(s) Oral two times a day  ketorolac   Injectable 15 milliGRAM(s) IV Push every 6 hours  multivitamin 1 Tablet(s) Oral daily  pantoprazole    Tablet 40 milliGRAM(s) Oral before breakfast  polyethylene glycol 3350 17 Gram(s) Oral at bedtime  senna 2 Tablet(s) Oral at bedtime  sodium chloride 0.9%. 1000 milliLiter(s) (75 mL/Hr) IV Continuous <Continuous>    MEDICATIONS  (PRN):  aluminum hydroxide/magnesium hydroxide/simethicone Suspension 30 milliLiter(s) Oral four times a day PRN Indigestion  HYDROmorphone   Tablet 4 milliGRAM(s) Oral every 3 hours PRN Severe Pain (7 - 10)  magnesium hydroxide Suspension 30 milliLiter(s) Oral daily PRN Constipation  ondansetron Injectable 4 milliGRAM(s) IV Push every 6 hours PRN Nausea and/or Vomiting  oxyCODONE    IR 10 milliGRAM(s) Oral every 3 hours PRN Moderate Pain (4 - 6)  oxyCODONE    IR 5 milliGRAM(s) Oral every 3 hours PRN Mild Pain (1 - 3)         76 y/o F with hx of Depression, Hypothyroidism, Glaucoma, Gerd, OA s/p status post left THR 10 years ago, she has left hip pain for 4 moths now, has been worsening, she came in here for elective Right Hip Total joint Replacement Direct anterior approach with Keith by Dr. Nj on 4/26/23 s/p procedure, Patient tolerated procedure well, her pain is well controlled, she has some dizziness, denies chest pain or sob, fever, chills, chest pain         Allergies:  	Macrodantin: Drug, Angioedema, Urticaria, Short breath  	Zosyn: Drug, Anaphylaxis  	sulfa drugs: Drug Category, Angioedema    PAST MEDICAL HISTORY:  Anxiety and depression     GERD (gastroesophageal reflux disease)     Glaucoma     H/O Hashimoto thyroiditis.     PAST SURGICAL HISTORY:  H/O bariatric surgery     H/O tubal ligation     History of cataract surgery     History of cholecystectomy     History of left hip replacement     History of tonsillectomy.     FAMILY HISTORY:  Sibling  Still living? No  Family history of osteoarthritis, Age at diagnosis: Age Unknown  FH: migraines, Age at diagnosis: Age Unknown  FHx: thyroid disease, Age at diagnosis: Age Unknown.     Anesthesia History:  · Previous Reaction to Anesthesia	none  · Family History of Anesthesia Reaction/Malignant Hyperthermia	No  · Latex Allergy	No    Social History:   Not a smoker, drinker or using any drugs      Home Medications:   * Incomplete Medication History as of 19-Apr-2023 09:19 documented in Structured Notes  · 	Synthroid 125 mcg (0.125 mg) oral tablet: Last Dose Taken:  , 1 orally every other day  · 	NexIUM 40 mg oral delayed release capsule: Last Dose Taken:  , 1 orally  · 	Synthroid 150 mcg (0.15 mg) oral tablet: Last Dose Taken:  , 1 orally every other day  · 	Vitamin C 500 mg oral capsule: 2 orally once a day  · 	Multiple Vitamins oral capsule: Last Dose Taken:  , 1 orally once a day  · 	PARoxetine 40 mg oral tablet: Last Dose Taken:  , 1 orally once a day  · 	magnesium carbonate 250 mg oral capsule: Last Dose Taken:  , orally once a day  · 	latanoprost 0.005% ophthalmic solution: Last Dose Taken:  , 1 in each eye once a day (at bedtime)  · 	Timolol Maleate (Eqv-Timoptic) 0.5% ophthalmic solution: 1 in each affected eye 2 times a day  · 	NexIUM 20 mg oral delayed release capsule: Last Dose Taken:  , 1 orally once a day  · 	ashwagandha: Last Dose Taken:    · 	neurovia: Last Dose Taken:            Vital Signs Last 24 Hrs  T(C): 36.7 (27 Apr 2023 08:59), Max: 36.7 (26 Apr 2023 20:00)  T(F): 98 (27 Apr 2023 08:59), Max: 98 (26 Apr 2023 20:00)  HR: 59 (27 Apr 2023 10:54) (51 - 62)  BP: 117/78 (27 Apr 2023 10:54) (96/61 - 156/77)  BP(mean): 82 (26 Apr 2023 20:30) (82 - 91)  RR: 18 (27 Apr 2023 08:59) (13 - 19)  SpO2: 94% (27 Apr 2023 08:59) (94% - 100%)    Parameters below as of 27 Apr 2023 08:59  Patient On (Oxygen Delivery Method): room air        PHYSICAL EXAM:    GENERAL: Elderly female looking comfortable   HEENT: PERRL, +EOMI  NECK: soft, Supple, No JVD,   CHEST/LUNG: Clear to auscultate bilaterally; No wheezing  HEART: S1S2+, Regular rate and rhythm; No murmurs  ABDOMEN: Soft, Nontender, Nondistended; Bowel sounds present  EXTREMITIES:  1+ Peripheral Pulses, No edema, right hip with clean dressings on, no bleeding or soaking   SKIN: No rashes or lesions  NEURO: AAOX3  PSYCH: normal mood      LABS:                  I&O's Summary    26 Apr 2023 07:01  -  27 Apr 2023 07:00  --------------------------------------------------------  IN: 825 mL / OUT: 150 mL / NET: 675 mL        MEDICATIONS  (STANDING):  acetaminophen     Tablet .. 975 milliGRAM(s) Oral every 8 hours  aspirin enteric coated 81 milliGRAM(s) Oral two times a day  ketorolac   Injectable 15 milliGRAM(s) IV Push every 6 hours  multivitamin 1 Tablet(s) Oral daily  pantoprazole    Tablet 40 milliGRAM(s) Oral before breakfast  polyethylene glycol 3350 17 Gram(s) Oral at bedtime  senna 2 Tablet(s) Oral at bedtime  sodium chloride 0.9%. 1000 milliLiter(s) (75 mL/Hr) IV Continuous <Continuous>    MEDICATIONS  (PRN):  aluminum hydroxide/magnesium hydroxide/simethicone Suspension 30 milliLiter(s) Oral four times a day PRN Indigestion  HYDROmorphone   Tablet 4 milliGRAM(s) Oral every 3 hours PRN Severe Pain (7 - 10)  magnesium hydroxide Suspension 30 milliLiter(s) Oral daily PRN Constipation  ondansetron Injectable 4 milliGRAM(s) IV Push every 6 hours PRN Nausea and/or Vomiting  oxyCODONE    IR 10 milliGRAM(s) Oral every 3 hours PRN Moderate Pain (4 - 6)  oxyCODONE    IR 5 milliGRAM(s) Oral every 3 hours PRN Mild Pain (1 - 3)

## 2023-04-28 VITALS
HEART RATE: 68 BPM | RESPIRATION RATE: 18 BRPM | OXYGEN SATURATION: 94 % | TEMPERATURE: 98 F | DIASTOLIC BLOOD PRESSURE: 68 MMHG | SYSTOLIC BLOOD PRESSURE: 131 MMHG

## 2023-04-28 PROCEDURE — 73502 X-RAY EXAM HIP UNI 2-3 VIEWS: CPT

## 2023-04-28 PROCEDURE — C1889: CPT

## 2023-04-28 PROCEDURE — C1776: CPT

## 2023-04-28 PROCEDURE — 97167 OT EVAL HIGH COMPLEX 60 MIN: CPT

## 2023-04-28 PROCEDURE — S2900: CPT

## 2023-04-28 PROCEDURE — C1713: CPT

## 2023-04-28 PROCEDURE — 99232 SBSQ HOSP IP/OBS MODERATE 35: CPT

## 2023-04-28 PROCEDURE — 36415 COLL VENOUS BLD VENIPUNCTURE: CPT

## 2023-04-28 RX ADMIN — PANTOPRAZOLE SODIUM 40 MILLIGRAM(S): 20 TABLET, DELAYED RELEASE ORAL at 06:04

## 2023-04-28 RX ADMIN — Medication 975 MILLIGRAM(S): at 06:04

## 2023-04-28 RX ADMIN — Medication 81 MILLIGRAM(S): at 06:04

## 2023-04-28 NOTE — PROGRESS NOTE ADULT - NS ATTEND AMEND GEN_ALL_CORE FT
Patient is see and evaluated, chart reviewed in detail, agree with assessment and plan of the NP  patient's pain is well controlled, has no complains  CTA b/l   Right hip Joint area is covered with clean dressing, no bleeding or soaking  Will continue with current plan of care  will d/c IV fluids.  Nausea resolved, tolerating food    Patient is stable from the medicine point of view for discharge pending PT and Ortho eval.

## 2023-04-28 NOTE — PROGRESS NOTE ADULT - SUBJECTIVE AND OBJECTIVE BOX
FAIZA TEODORO    [id number]    History: Patient is status post right anterior total hip arthroplasty on 4/26/2023, POD #2 . Patient is doing well and is comfortable. The patient's pain is controlled using the prescribed pain medications. The patient is participating in physical therapy. Pt has been npo overnight because of nausea/vomiting.  Pt feeling better, no longer nauseous, will order breakfast No new complaints.          Physical exam: The right hip dressing is clean, dry and intact. No drainage or discharge. No erythema is noted. No blistering. No ecchymosis. The calf is supple nontender. dressing to nonoperative side c/d/i.  No calf tenderness. Sensation to light touch is grossly intact distally. The lateral cutaneous nerve is intact. Motor function distally is 5/5. No foot drop. 2+ dorsalis pedis pulse. Capillary refill is less than 2 seconds. No cyanosis.    Primary Orthopedic Assessment:  • s/p RIGHT ANTERIOR total hip replacement pod #2    Plan:   npo discontinued by med, will advance diet  • DVT prophylaxis aspirin 81mg bid, including use of compression devices and ankle pumps  • Continue physical therapy  • Weightbearing as tolerated of the right lower extremity with assistance of a walker  • Incentive spirometry encouraged  • Pain control as clinically indicated  • Anterior hip precautions reviewed with patient  • Discharge planning – anticipated discharge is Home when cleared by med/PT

## 2023-04-28 NOTE — PROGRESS NOTE ADULT - ASSESSMENT
OA of the Right hip s/p THR post op day 02:     - post op no complications  - VS stable   - advance diet as tolerated  - opiate induced constipation regimen   - encouraging incentive spirometry   -c/w local wound care per ortho   -DVT prophylaxis and Pain meds as per Ortho team   -PT/OT and weight bearing per ortho       Hypothyroid: On Synthroid     GERD: On Nexium  40 mg daily     Anxiety:  Continue home medication     Glaucoma: Latanoprost 0.005% 1 in each eye once a day (at bedtime) and Timolol Maleate 1 in each affected eye 2 times a day      Dizziness/Nausea: Resolved, diet advanced. Please give po pain medications with food    No medical contraindications to discharge 78 y/o F with hx of Depression, Hypothyroidism, Glaucoma, Gerd, OA s/p status post left THR 10 years ago, she has left hip pain for 4 moths now, has been worsening, she came in here for elective Right Hip Total joint Replacement Direct anterior approach with Keith by Dr. Nj on 4/26/23 s/p procedure.       OA of the Right hip s/p THR post op day 02:     - post op no complications  - VS stable   - advance diet as tolerated  - opiate induced constipation regimen   - encouraging incentive spirometry   -c/w local wound care per ortho   -DVT prophylaxis and Pain meds as per Ortho team   -PT/OT and weight bearing per ortho       Hypothyroid: On Synthroid     GERD: On Nexium  40 mg daily     Anxiety:  Continue home medication     Glaucoma: Latanoprost 0.005% 1 in each eye once a day (at bedtime) and Timolol Maleate 1 in each affected eye 2 times a day      Dizziness/Nausea: Resolved, diet advanced. Please give po pain medications with food    No medical contraindications to discharge 78 y/o F with hx of Depression, Hypothyroidism, Glaucoma, Gerd, OA s/p status post left THR 10 years ago, she has left hip pain for 4 moths now, has been worsening, she came in here for elective Right Hip Total joint Replacement Direct anterior approach with Keith by Dr. Nj on 4/26/23 s/p procedure.       OA of the Right hip s/p THR post op day 02:     - post op no complications  - VS stable   - advance diet as tolerated  - opiate induced constipation regimen   - encouraging incentive spirometry   -c/w local wound care per ortho   -DVT prophylaxis and Pain meds as per Ortho team   -PT/OT and weight bearing per ortho       Hypothyroid: On Synthroid     GERD: On Nexium  40 mg daily     Anxiety:  Continue home medication     Glaucoma: Latanoprost 0.005% 1 in each eye once a day (at bedtime) and Timolol Maleate 1 in each affected eye 2 times a day    Dizziness/Nausea: Resolved, diet advanced. Please give po pain medications with food.     No medical contraindications to discharge

## 2023-04-28 NOTE — PROGRESS NOTE ADULT - SUBJECTIVE AND OBJECTIVE BOX
CC: Right total hip arthroplasty        INTERVAL HPI/OVERNIGHT EVENTS: Patient seen and examined. Nauseous yesterday after pain medication on empty stomach. Tolerated CLD this am, wants to eat. Denies chest pain, SOB, dizziness, nausea, vomiting , fever, chills. Pain controlled on current RX.     Vital Signs Last 24 Hrs  T(C): 36.8 (28 Apr 2023 09:00), Max: 37 (27 Apr 2023 21:13)  T(F): 98.2 (28 Apr 2023 09:00), Max: 98.6 (27 Apr 2023 21:13)  HR: 68 (28 Apr 2023 09:00) (59 - 68)  BP: 131/68 (28 Apr 2023 09:00) (97/54 - 133/71)  BP(mean): --  RR: 18 (28 Apr 2023 09:00) (18 - 18)  SpO2: 94% (28 Apr 2023 09:00) (93% - 94%)    Parameters below as of 28 Apr 2023 09:00  Patient On (Oxygen Delivery Method): room air    PHYSICAL EXAM:    GENERAL: Awake, alert, NAD  HEENT: PERRL, +EOMI  NECK: soft, Supple, No JVD,   CHEST/LUNG: Clear to auscultate bilaterally; No wheezing  HEART: S1S2+, Regular rate and rhythm; No murmurs  ABDOMEN: Soft, Nontender, Nondistended; Bowel sounds present  EXTREMITIES:  1+ Peripheral Pulses, No edema, right hip dressing C/D/I  SKIN: No rashes or lesions  NEURO: AAOX3, nonfocal        I&O's Detail    27 Apr 2023 07:01  -  28 Apr 2023 07:00  --------------------------------------------------------  IN:    Lactated Ringers Bolus: 500 mL    sodium chloride 0.9%: 825 mL  Total IN: 1325 mL    OUT:    Voided (mL): 3 mL  Total OUT: 3 mL    Total NET: 1322 mL                        CAPILLARY BLOOD GLUCOSE              MEDICATIONS  (STANDING):  acetaminophen     Tablet .. 975 milliGRAM(s) Oral every 8 hours  aspirin enteric coated 81 milliGRAM(s) Oral two times a day  multivitamin 1 Tablet(s) Oral daily  pantoprazole    Tablet 40 milliGRAM(s) Oral before breakfast  polyethylene glycol 3350 17 Gram(s) Oral at bedtime  senna 2 Tablet(s) Oral at bedtime  sodium chloride 0.9%. 1000 milliLiter(s) (75 mL/Hr) IV Continuous <Continuous>    MEDICATIONS  (PRN):  aluminum hydroxide/magnesium hydroxide/simethicone Suspension 30 milliLiter(s) Oral four times a day PRN Indigestion  bisacodyl Suppository 10 milliGRAM(s) Rectal once PRN Constipation  HYDROmorphone   Tablet 4 milliGRAM(s) Oral every 3 hours PRN Severe Pain (7 - 10)  magnesium hydroxide Suspension 30 milliLiter(s) Oral daily PRN Constipation  ondansetron Injectable 4 milliGRAM(s) IV Push every 6 hours PRN Nausea and/or Vomiting  oxyCODONE    IR 5 milliGRAM(s) Oral every 3 hours PRN Mild Pain (1 - 3)  oxyCODONE    IR 10 milliGRAM(s) Oral every 3 hours PRN Moderate Pain (4 - 6)      RADIOLOGY & ADDITIONAL TESTS:   CC: Right total hip arthroplasty        INTERVAL HPI/OVERNIGHT EVENTS: Patient seen and examined. Nauseous yesterday after pain medication on empty stomach. Tolerated CLD this am, wants to eat. Denies chest pain, SOB, dizziness, nausea, vomiting , fever, chills. Pain controlled on current RX.     Vital Signs Last 24 Hrs  T(C): 36.8 (28 Apr 2023 09:00), Max: 37 (27 Apr 2023 21:13)  T(F): 98.2 (28 Apr 2023 09:00), Max: 98.6 (27 Apr 2023 21:13)  HR: 68 (28 Apr 2023 09:00) (59 - 68)  BP: 131/68 (28 Apr 2023 09:00) (97/54 - 133/71)  RR: 18 (28 Apr 2023 09:00) (18 - 18)  SpO2: 94% (28 Apr 2023 09:00) (93% - 94%)    Parameters below as of 28 Apr 2023 09:00  Patient On (Oxygen Delivery Method): room air    PHYSICAL EXAM:    GENERAL: Elderly female looking comfortable   HEENT: PERRL, +EOMI  NECK: soft, Supple, No JVD,   CHEST/LUNG: Clear to auscultate bilaterally; No wheezing  HEART: S1S2+, Regular rate and rhythm; No murmurs  ABDOMEN: Soft, Nontender, Nondistended; Bowel sounds present  EXTREMITIES:  1+ Peripheral Pulses, No edema, right hip with clean dressings on, no bleeding or soaking   SKIN: No rashes or lesions  NEURO: AAOX3  PSYCH: normal mood        I&O's Detail    27 Apr 2023 07:01  -  28 Apr 2023 07:00  --------------------------------------------------------  IN:    Lactated Ringers Bolus: 500 mL    sodium chloride 0.9%: 825 mL  Total IN: 1325 mL    OUT:    Voided (mL): 3 mL  Total OUT: 3 mL    Total NET: 1322 mL                        CAPILLARY BLOOD GLUCOSE              MEDICATIONS  (STANDING):  acetaminophen     Tablet .. 975 milliGRAM(s) Oral every 8 hours  aspirin enteric coated 81 milliGRAM(s) Oral two times a day  multivitamin 1 Tablet(s) Oral daily  pantoprazole    Tablet 40 milliGRAM(s) Oral before breakfast  polyethylene glycol 3350 17 Gram(s) Oral at bedtime  senna 2 Tablet(s) Oral at bedtime  sodium chloride 0.9%. 1000 milliLiter(s) (75 mL/Hr) IV Continuous <Continuous>    MEDICATIONS  (PRN):  aluminum hydroxide/magnesium hydroxide/simethicone Suspension 30 milliLiter(s) Oral four times a day PRN Indigestion  bisacodyl Suppository 10 milliGRAM(s) Rectal once PRN Constipation  HYDROmorphone   Tablet 4 milliGRAM(s) Oral every 3 hours PRN Severe Pain (7 - 10)  magnesium hydroxide Suspension 30 milliLiter(s) Oral daily PRN Constipation  ondansetron Injectable 4 milliGRAM(s) IV Push every 6 hours PRN Nausea and/or Vomiting  oxyCODONE    IR 5 milliGRAM(s) Oral every 3 hours PRN Mild Pain (1 - 3)  oxyCODONE    IR 10 milliGRAM(s) Oral every 3 hours PRN Moderate Pain (4 - 6)      RADIOLOGY & ADDITIONAL TESTS:

## 2023-04-28 NOTE — OCCUPATIONAL THERAPY INITIAL EVALUATION ADULT - PERTINENT HX OF CURRENT PROBLEM, REHAB EVAL
As per MD note: 78 y/o F with hx of Depression, Hypothyroidism, Glaucoma, Gerd, OA s/p status post left THR 10 years ago, she has left hip pain for 4 moths now, has been worsening, she came in here for elective Right Hip Total joint Replacement Direct anterior approach with Keith by Dr. Nj on 4/26/23 s/p procedure.

## 2023-04-29 ENCOUNTER — TRANSCRIPTION ENCOUNTER (OUTPATIENT)
Age: 78
End: 2023-04-29

## 2023-05-10 ENCOUNTER — TRANSCRIPTION ENCOUNTER (OUTPATIENT)
Age: 78
End: 2023-05-10

## 2023-05-16 ENCOUNTER — APPOINTMENT (OUTPATIENT)
Dept: ORTHOPEDIC SURGERY | Facility: CLINIC | Age: 78
End: 2023-05-16
Payer: MEDICARE

## 2023-05-16 VITALS — WEIGHT: 136 LBS | BODY MASS INDEX: 25.68 KG/M2 | HEIGHT: 61 IN

## 2023-05-16 PROCEDURE — 99024 POSTOP FOLLOW-UP VISIT: CPT

## 2023-05-16 PROCEDURE — 73502 X-RAY EXAM HIP UNI 2-3 VIEWS: CPT

## 2023-05-16 NOTE — HISTORY OF PRESENT ILLNESS
[de-identified] : S/P Right roboticassisted anterior total hip replacement with SASCHA, DOS: 4/26/23. [de-identified] : FAIZA VALERIO is doing well 3 week s/p right total hip replacement. She is participating in home physical therapy, as well as a home exercise program daily. Her pain level is controlled. She is taking aspirin 81 mg twice a day for DVT prophylaxis. Overall, she is very happy with the results of the surgery so far.		  [de-identified] : Exam of the right hip and contralateral iliac crest shows well healing incisions, hip external rotation of 30 degrees, hip internal rotation of 20 degrees. Patient can perform a straight leg raise. 5/5 motor strength bilaterally distally. Sensation intact distally.		  [de-identified] :  X-ray: AP of the pelvis and 2 views of the right hip demonstrate a right total hip arthroplasty in stable position, with no evidence of fracture, loosening, or dislocation.		  [de-identified] : The patient is doing very well 3 weeks after right anterior total hip replacement. The patient will be transitioned to outpatient physical therapy and a prescription was given for that. The patient will take aspirin 81 mg twice per day for DVT prophylaxis for the next three weeks. Anterior hip precautions were reinforced. Overall the patient is very happy with their outcome so far. Followup in 3 weeks with repeat x-rays.

## 2023-05-16 NOTE — ADDENDUM
[FreeTextEntry1] : This note was authored by Oniel Liang working as a medical scribe for Dr. Augustin Nj. The note was reviewed, edited, and revised by Dr. Augustin Nj whom is in agreement with the exam findings, imaging findings, and treatment plan. 05/16/2023

## 2023-06-06 ENCOUNTER — APPOINTMENT (OUTPATIENT)
Dept: ORTHOPEDIC SURGERY | Facility: CLINIC | Age: 78
End: 2023-06-06
Payer: MEDICARE

## 2023-06-06 VITALS — WEIGHT: 136 LBS | HEIGHT: 61 IN | BODY MASS INDEX: 25.68 KG/M2

## 2023-06-06 DIAGNOSIS — Z78.9 OTHER SPECIFIED HEALTH STATUS: ICD-10-CM

## 2023-06-06 DIAGNOSIS — Z47.1 AFTERCARE FOLLOWING JOINT REPLACEMENT SURGERY: ICD-10-CM

## 2023-06-06 DIAGNOSIS — Z85.828 PERSONAL HISTORY OF OTHER MALIGNANT NEOPLASM OF SKIN: ICD-10-CM

## 2023-06-06 DIAGNOSIS — Z80.9 FAMILY HISTORY OF MALIGNANT NEOPLASM, UNSPECIFIED: ICD-10-CM

## 2023-06-06 DIAGNOSIS — Z96.649 PRESENCE OF UNSPECIFIED ARTIFICIAL HIP JOINT: ICD-10-CM

## 2023-06-06 DIAGNOSIS — Z87.39 PERSONAL HISTORY OF OTHER DISEASES OF THE MUSCULOSKELETAL SYSTEM AND CONNECTIVE TISSUE: ICD-10-CM

## 2023-06-06 DIAGNOSIS — F17.200 NICOTINE DEPENDENCE, UNSPECIFIED, UNCOMPLICATED: ICD-10-CM

## 2023-06-06 DIAGNOSIS — Z96.641 PRESENCE OF RIGHT ARTIFICIAL HIP JOINT: ICD-10-CM

## 2023-06-06 DIAGNOSIS — Z82.61 FAMILY HISTORY OF ARTHRITIS: ICD-10-CM

## 2023-06-06 PROBLEM — Z86.39 PERSONAL HISTORY OF OTHER ENDOCRINE, NUTRITIONAL AND METABOLIC DISEASE: Chronic | Status: ACTIVE | Noted: 2023-04-19

## 2023-06-06 PROBLEM — H40.9 UNSPECIFIED GLAUCOMA: Chronic | Status: ACTIVE | Noted: 2023-04-19

## 2023-06-06 PROBLEM — F41.9 ANXIETY DISORDER, UNSPECIFIED: Chronic | Status: ACTIVE | Noted: 2023-04-19

## 2023-06-06 PROBLEM — K21.9 GASTRO-ESOPHAGEAL REFLUX DISEASE WITHOUT ESOPHAGITIS: Chronic | Status: ACTIVE | Noted: 2023-04-19

## 2023-06-06 PROCEDURE — 99024 POSTOP FOLLOW-UP VISIT: CPT

## 2023-06-06 PROCEDURE — 73502 X-RAY EXAM HIP UNI 2-3 VIEWS: CPT

## 2023-06-06 RX ORDER — PAROXETINE HYDROCHLORIDE 40 MG/1
40 TABLET, FILM COATED ORAL
Refills: 0 | Status: ACTIVE | COMMUNITY

## 2023-06-06 RX ORDER — TIMOLOL MALEATE 5 MG/ML
0.5 SOLUTION OPHTHALMIC
Refills: 0 | Status: ACTIVE | COMMUNITY

## 2023-06-06 RX ORDER — LEVOTHYROXINE SODIUM 125 UG/1
125 TABLET ORAL
Refills: 0 | Status: ACTIVE | COMMUNITY

## 2023-06-06 RX ORDER — LATANOPROST/PF 0.005 %
0.01 DROPS OPHTHALMIC (EYE)
Refills: 0 | Status: ACTIVE | COMMUNITY

## 2023-06-06 NOTE — ADDENDUM
[FreeTextEntry1] : This note was authored by Oniel Liang working as a medical scribe for Dr. Augustin Nj. The note was reviewed, edited, and revised by Dr. Augustin Nj whom is in agreement with the exam findings, imaging findings, and treatment plan. 06/06/2023

## 2023-06-06 NOTE — HISTORY OF PRESENT ILLNESS
[de-identified] : S/P Right robotic_assisted anterior total hip replacement with SASCHA, DOS: 4/26/23. \par \par  [de-identified] : FAIZA VALERIO is a 77 year female 6 weeks s/p right THR.  She  is ambulating and transferring well without assistive devices. She  reports continuing outpatient physical therapy with good improvement of strength. She continues aspirin for DVT prophylaxis. She  has returned to daily activities of life. She reports mild discomfort in her anterolateral thigh while walking. Overall, she is very happy with the results of the surgery.		  [de-identified] : Exam of the right hip shows a well healed incision, hip flexion of 100 degrees, hip external rotation of 45 degrees(with minimal discomfort). 5/5 motor strength bilaterally distally. Sensation intact distally.		  [de-identified] :  X-ray: AP of the pelvis and 2 views of the right hip demonstrate a right total hip arthroplasty in stable position, with no evidence of fracture, loosening, or dislocation.		  [de-identified] : The patient is doing very well 6 weeks following anterior right total hip replacement. Anterior hip precautions were reviewed and recommended to be followed for another 6 weeks. The patient will continue outpatient physical therapy and gradually transition to a home exercise program over the next 6 weeks. The patient may stop taking full strength aspirin at this point and resume preoperative aspirin dosing if applicable. Dental prophylaxis was reviewed. The patient is doing very well so far and overall very happy with their progress. Follow up in 6 weeks.

## 2023-06-12 ENCOUNTER — RX ONLY (RX ONLY)
Age: 78
End: 2023-06-12

## 2023-07-11 ENCOUNTER — OFFICE (OUTPATIENT)
Facility: LOCATION | Age: 78
Setting detail: OPHTHALMOLOGY
End: 2023-07-11
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
## 2023-07-11 PROCEDURE — NO SHOW FE NO SHOW FEE

## 2023-07-25 ENCOUNTER — APPOINTMENT (OUTPATIENT)
Dept: ORTHOPEDIC SURGERY | Facility: CLINIC | Age: 78
End: 2023-07-25
Payer: MEDICARE

## 2023-07-25 VITALS — BODY MASS INDEX: 25.68 KG/M2 | WEIGHT: 136 LBS | HEIGHT: 61 IN

## 2023-07-25 PROCEDURE — 73502 X-RAY EXAM HIP UNI 2-3 VIEWS: CPT

## 2023-07-25 PROCEDURE — 99024 POSTOP FOLLOW-UP VISIT: CPT

## 2023-07-25 NOTE — ADDENDUM
[FreeTextEntry1] : This note was authored by Oniel Liang working as a medical scribe for Dr. Augustin Nj. The note was reviewed, edited, and revised by Dr. Augustin Nj whom is in agreement with the exam findings, imaging findings, and treatment plan. 07/25/2023

## 2023-07-25 NOTE — HISTORY OF PRESENT ILLNESS
[de-identified] : S/P Right robotic_assisted anterior total hip replacement with SASCHA, DOS: 4/26/23. \par \par  [de-identified] : FAIZA VALERIO is a 77 year female 3 months s/p right THR She reports completing outpatient physical therapy and transitioning to a home exercise routine. She has returned to daily activities of life without significant pain or discomfort. Overall, she is very happy with the result of the surgery.		  [de-identified] : Exam of the right hip and contralateral iliac crest shows well healed incisions, hip flexion of 110 degrees, hip external rotation of 50 degrees, hip internal rotation of 25 degrees. Patient can perform a straight leg raise.  5/5 motor strength bilaterally distally. Sensation intact distally.		  [de-identified] :  X-ray: AP of the pelvis and 2 views of the right hip demonstrate a right total hip arthroplasty in stable position, with no evidence of fracture, loosening, or dislocation.		  [de-identified] : The patient is doing very well 3 months following right total hip replacement. The patient will continue their home exercises. Overall she  is making excellent progress and is very happy with the result so far.  Dental prophylaxis was reviewed. Follow up one year post op for radiographic surveillance.

## 2023-08-02 ENCOUNTER — NON-APPOINTMENT (OUTPATIENT)
Age: 78
End: 2023-08-02

## 2023-09-11 ENCOUNTER — APPOINTMENT (OUTPATIENT)
Dept: ORTHOPEDIC SURGERY | Facility: CLINIC | Age: 78
End: 2023-09-11
Payer: MEDICARE

## 2023-09-11 VITALS — HEIGHT: 61 IN | WEIGHT: 136 LBS | BODY MASS INDEX: 25.68 KG/M2

## 2023-09-11 DIAGNOSIS — M84.48XA PATHOLOGICAL FRACTURE, OTHER SITE, INITIAL ENCOUNTER FOR FRACTURE: ICD-10-CM

## 2023-09-11 PROCEDURE — 99204 OFFICE O/P NEW MOD 45 MIN: CPT

## 2023-12-15 ENCOUNTER — OFFICE (OUTPATIENT)
Facility: LOCATION | Age: 78
Setting detail: OPHTHALMOLOGY
End: 2023-12-15
Payer: MEDICARE

## 2023-12-15 DIAGNOSIS — H01.005: ICD-10-CM

## 2023-12-15 DIAGNOSIS — H26.493: ICD-10-CM

## 2023-12-15 DIAGNOSIS — H01.002: ICD-10-CM

## 2023-12-15 DIAGNOSIS — H01.004: ICD-10-CM

## 2023-12-15 DIAGNOSIS — H16.223: ICD-10-CM

## 2023-12-15 DIAGNOSIS — H01.001: ICD-10-CM

## 2023-12-15 PROBLEM — H11.153 PINGUECULA; BOTH EYES: Status: ACTIVE | Noted: 2023-12-15

## 2023-12-15 PROBLEM — H00.11 CHALAZION; RIGHT UPPER LID: Status: ACTIVE | Noted: 2023-12-15

## 2023-12-15 PROCEDURE — 99213 OFFICE O/P EST LOW 20 MIN: CPT | Performed by: OPHTHALMOLOGY

## 2023-12-15 ASSESSMENT — LID EXAM ASSESSMENTS
OD_BLEPHARITIS: 1+
OD_COMMENTS: SQUAMOUS
OS_BLEPHARITIS: 1+
OS_COMMENTS: SQUAMOUS

## 2023-12-15 ASSESSMENT — CONFRONTATIONAL VISUAL FIELD TEST (CVF)
OS_FINDINGS: FULL
OD_FINDINGS: FULL

## 2023-12-15 ASSESSMENT — SUPERFICIAL PUNCTATE KERATITIS (SPK)
OD_SPK: 1+
OS_SPK: 1+

## 2023-12-18 ENCOUNTER — RX ONLY (RX ONLY)
Age: 78
End: 2023-12-18

## 2023-12-18 PROBLEM — H40.1131: Status: ACTIVE | Noted: 2023-12-15

## 2024-01-05 ENCOUNTER — OFFICE (OUTPATIENT)
Facility: LOCATION | Age: 79
Setting detail: OPHTHALMOLOGY
End: 2024-01-05
Payer: MEDICARE

## 2024-01-05 DIAGNOSIS — H00.15: ICD-10-CM

## 2024-01-05 PROBLEM — H01.00 BLEPHARITIS; RIGHT UPPER LID, RIGHT LOWER LID, LEFT UPPER LID, LEFT LOWER LID: Status: ACTIVE | Noted: 2024-01-05

## 2024-01-05 PROCEDURE — 99213 OFFICE O/P EST LOW 20 MIN: CPT | Performed by: OPHTHALMOLOGY

## 2024-01-05 ASSESSMENT — LID EXAM ASSESSMENTS
OS_COMMENTS: SQUAMOUS
OD_COMMENTS: SQUAMOUS
OS_BLEPHARITIS: 1+
OD_BLEPHARITIS: 1+

## 2024-01-05 ASSESSMENT — CONFRONTATIONAL VISUAL FIELD TEST (CVF)
OS_FINDINGS: FULL
OD_FINDINGS: FULL

## 2024-05-24 ENCOUNTER — NON-APPOINTMENT (OUTPATIENT)
Age: 79
End: 2024-05-24

## 2024-12-25 PROBLEM — F10.90 ALCOHOL USE: Status: ACTIVE | Noted: 2023-06-06

## 2025-04-16 ENCOUNTER — NON-APPOINTMENT (OUTPATIENT)
Age: 80
End: 2025-04-16

## 2025-04-17 ENCOUNTER — APPOINTMENT (OUTPATIENT)
Dept: INFECTIOUS DISEASE | Facility: CLINIC | Age: 80
End: 2025-04-17

## 2025-04-17 VITALS
DIASTOLIC BLOOD PRESSURE: 60 MMHG | WEIGHT: 152 LBS | SYSTOLIC BLOOD PRESSURE: 110 MMHG | BODY MASS INDEX: 29.84 KG/M2 | TEMPERATURE: 98.2 F | HEIGHT: 60 IN | HEART RATE: 82 BPM

## 2025-04-17 DIAGNOSIS — N39.0 URINARY TRACT INFECTION, SITE NOT SPECIFIED: ICD-10-CM

## 2025-04-17 LAB
BILIRUB UR QL STRIP: NEGATIVE
GLUCOSE UR-MCNC: NEGATIVE
HCG UR QL: 0.2 EU/DL
HGB UR QL STRIP.AUTO: NEGATIVE
KETONES UR-MCNC: NEGATIVE
LEUKOCYTE ESTERASE UR QL STRIP: NEGATIVE
NITRITE UR QL STRIP: NEGATIVE
PH UR STRIP: 6
PROT UR STRIP-MCNC: NEGATIVE
SP GR UR STRIP: 1.02

## 2025-04-17 PROCEDURE — 81003 URINALYSIS AUTO W/O SCOPE: CPT | Mod: QW

## 2025-04-17 PROCEDURE — 99214 OFFICE O/P EST MOD 30 MIN: CPT

## 2025-04-17 RX ORDER — MAGNESIUM 100 MG
100 TABLET ORAL
Refills: 0 | Status: ACTIVE | COMMUNITY

## 2025-04-17 RX ORDER — EXEMESTANE 25 MG/1
25 TABLET, FILM COATED ORAL
Refills: 0 | Status: ACTIVE | COMMUNITY

## 2025-04-17 RX ORDER — ALENDRONATE SODIUM 70 MG/1
70 TABLET ORAL
Refills: 0 | Status: ACTIVE | COMMUNITY

## 2025-04-17 RX ORDER — MULTIVIT-MIN/FOLIC/VIT K/LYCOP 400-300MCG
25 MCG TABLET ORAL
Refills: 0 | Status: ACTIVE | COMMUNITY

## 2025-04-24 ENCOUNTER — APPOINTMENT (OUTPATIENT)
Dept: ORTHOPEDIC SURGERY | Facility: CLINIC | Age: 80
End: 2025-04-24
Payer: MEDICARE

## 2025-04-24 VITALS — WEIGHT: 152 LBS | HEIGHT: 60 IN | BODY MASS INDEX: 29.84 KG/M2

## 2025-04-24 DIAGNOSIS — S80.01XA CONTUSION OF RIGHT KNEE, INITIAL ENCOUNTER: ICD-10-CM

## 2025-04-24 PROCEDURE — 99214 OFFICE O/P EST MOD 30 MIN: CPT

## 2025-04-29 ENCOUNTER — APPOINTMENT (OUTPATIENT)
Dept: INFECTIOUS DISEASE | Facility: CLINIC | Age: 80
End: 2025-04-29
Payer: MEDICARE

## 2025-04-29 VITALS
HEART RATE: 71 BPM | TEMPERATURE: 98.2 F | OXYGEN SATURATION: 97 % | DIASTOLIC BLOOD PRESSURE: 60 MMHG | SYSTOLIC BLOOD PRESSURE: 116 MMHG

## 2025-04-29 DIAGNOSIS — N39.0 URINARY TRACT INFECTION, SITE NOT SPECIFIED: ICD-10-CM

## 2025-04-29 PROCEDURE — 99213 OFFICE O/P EST LOW 20 MIN: CPT

## 2025-04-29 RX ORDER — AMOXICILLIN AND CLAVULANATE POTASSIUM 875; 125 MG/1; MG/1
875-125 TABLET, COATED ORAL
Qty: 14 | Refills: 2 | Status: ACTIVE | COMMUNITY
Start: 2025-04-29 | End: 1900-01-01

## 2025-05-13 ENCOUNTER — LABORATORY RESULT (OUTPATIENT)
Age: 80
End: 2025-05-13

## 2025-05-13 ENCOUNTER — APPOINTMENT (OUTPATIENT)
Dept: INFECTIOUS DISEASE | Facility: CLINIC | Age: 80
End: 2025-05-13
Payer: MEDICARE

## 2025-05-13 VITALS
OXYGEN SATURATION: 96 % | HEIGHT: 60 IN | DIASTOLIC BLOOD PRESSURE: 70 MMHG | SYSTOLIC BLOOD PRESSURE: 120 MMHG | WEIGHT: 150 LBS | TEMPERATURE: 98.4 F | HEART RATE: 86 BPM | BODY MASS INDEX: 29.45 KG/M2

## 2025-05-13 DIAGNOSIS — N39.0 URINARY TRACT INFECTION, SITE NOT SPECIFIED: ICD-10-CM

## 2025-05-13 LAB
BILIRUB UR QL STRIP: NORMAL
GLUCOSE UR-MCNC: NORMAL
HCG UR QL: 0.2 EU/DL
HGB UR QL STRIP.AUTO: NORMAL
KETONES UR-MCNC: NORMAL
LEUKOCYTE ESTERASE UR QL STRIP: NORMAL
NITRITE UR QL STRIP: NORMAL
PH UR STRIP: 6
PROT UR STRIP-MCNC: NORMAL
SP GR UR STRIP: 1.01

## 2025-05-13 PROCEDURE — 81003 URINALYSIS AUTO W/O SCOPE: CPT | Mod: QW

## 2025-05-13 PROCEDURE — 99214 OFFICE O/P EST MOD 30 MIN: CPT

## 2025-05-14 LAB
APPEARANCE: CLEAR
BILIRUBIN URINE: NEGATIVE
BLOOD URINE: NEGATIVE
COLOR: YELLOW
GLUCOSE QUALITATIVE U: NEGATIVE MG/DL
KETONES URINE: NEGATIVE MG/DL
LEUKOCYTE ESTERASE URINE: ABNORMAL
NITRITE URINE: NEGATIVE
PH URINE: 6
PROTEIN URINE: NEGATIVE MG/DL
SPECIFIC GRAVITY URINE: 1.02
UROBILINOGEN URINE: 1 MG/DL

## 2025-05-15 LAB — BACTERIA UR CULT: NORMAL
